# Patient Record
Sex: FEMALE | Race: WHITE | Employment: FULL TIME | URBAN - METROPOLITAN AREA
[De-identification: names, ages, dates, MRNs, and addresses within clinical notes are randomized per-mention and may not be internally consistent; named-entity substitution may affect disease eponyms.]

---

## 2021-05-08 ENCOUNTER — APPOINTMENT (OUTPATIENT)
Dept: GENERAL RADIOLOGY | Age: 71
DRG: 481 | End: 2021-05-08
Attending: ORTHOPAEDIC SURGERY
Payer: OTHER MISCELLANEOUS

## 2021-05-08 ENCOUNTER — APPOINTMENT (OUTPATIENT)
Dept: GENERAL RADIOLOGY | Age: 71
DRG: 481 | End: 2021-05-08
Attending: STUDENT IN AN ORGANIZED HEALTH CARE EDUCATION/TRAINING PROGRAM
Payer: OTHER MISCELLANEOUS

## 2021-05-08 ENCOUNTER — HOSPITAL ENCOUNTER (INPATIENT)
Age: 71
LOS: 4 days | Discharge: HOME HEALTH | DRG: 481 | End: 2021-05-12
Attending: STUDENT IN AN ORGANIZED HEALTH CARE EDUCATION/TRAINING PROGRAM | Admitting: HOSPITALIST
Payer: OTHER MISCELLANEOUS

## 2021-05-08 ENCOUNTER — ANESTHESIA EVENT (OUTPATIENT)
Dept: SURGERY | Age: 71
DRG: 481 | End: 2021-05-08
Payer: OTHER MISCELLANEOUS

## 2021-05-08 ENCOUNTER — ANESTHESIA (OUTPATIENT)
Dept: SURGERY | Age: 71
DRG: 481 | End: 2021-05-08
Payer: OTHER MISCELLANEOUS

## 2021-05-08 DIAGNOSIS — S72.002A CLOSED FRACTURE OF LEFT HIP, INITIAL ENCOUNTER (HCC): ICD-10-CM
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
## 2021-05-08 DIAGNOSIS — S22.42XA CLOSED FRACTURE OF MULTIPLE RIBS OF LEFT SIDE, INITIAL ENCOUNTER: Primary | ICD-10-CM

## 2021-05-08 PROBLEM — F17.200 SMOKING: Status: ACTIVE | Noted: 2021-05-08

## 2021-05-08 LAB
AMPHET UR QL SCN: NEGATIVE
ANION GAP SERPL CALC-SCNC: 9 MMOL/L (ref 3–18)
BARBITURATES UR QL SCN: NEGATIVE
BASOPHILS # BLD: 0 K/UL (ref 0–0.1)
BASOPHILS NFR BLD: 0 % (ref 0–2)
BENZODIAZ UR QL: POSITIVE
BUN SERPL-MCNC: 7 MG/DL (ref 7–18)
BUN/CREAT SERPL: 12 (ref 12–20)
CALCIUM SERPL-MCNC: 8.4 MG/DL (ref 8.5–10.1)
CANNABINOIDS UR QL SCN: NEGATIVE
CHLORIDE SERPL-SCNC: 108 MMOL/L (ref 100–111)
CO2 SERPL-SCNC: 24 MMOL/L (ref 21–32)
COCAINE UR QL SCN: NEGATIVE
COVID-19 RAPID TEST, COVR: NOT DETECTED
CREAT SERPL-MCNC: 0.57 MG/DL (ref 0.6–1.3)
DIFFERENTIAL METHOD BLD: ABNORMAL
EOSINOPHIL # BLD: 0.1 K/UL (ref 0–0.4)
EOSINOPHIL NFR BLD: 1 % (ref 0–5)
ERYTHROCYTE [DISTWIDTH] IN BLOOD BY AUTOMATED COUNT: 12.9 % (ref 11.6–14.5)
EST. AVERAGE GLUCOSE BLD GHB EST-MCNC: 128 MG/DL
GLUCOSE BLD STRIP.AUTO-MCNC: 127 MG/DL (ref 70–110)
GLUCOSE BLD STRIP.AUTO-MCNC: 238 MG/DL (ref 70–110)
GLUCOSE SERPL-MCNC: 154 MG/DL (ref 74–99)
HBA1C MFR BLD: 6.1 % (ref 4.2–5.6)
HCT VFR BLD AUTO: 38 % (ref 35–45)
HDSCOM,HDSCOM: ABNORMAL
HGB BLD-MCNC: 12.4 G/DL (ref 12–16)
LYMPHOCYTES # BLD: 1.4 K/UL (ref 0.9–3.6)
LYMPHOCYTES NFR BLD: 16 % (ref 21–52)
MCH RBC QN AUTO: 33.6 PG (ref 24–34)
MCHC RBC AUTO-ENTMCNC: 32.6 G/DL (ref 31–37)
MCV RBC AUTO: 103 FL (ref 74–97)
METHADONE UR QL: NEGATIVE
MONOCYTES # BLD: 0.7 K/UL (ref 0.05–1.2)
MONOCYTES NFR BLD: 8 % (ref 3–10)
NEUTS SEG # BLD: 6.4 K/UL (ref 1.8–8)
NEUTS SEG NFR BLD: 74 % (ref 40–73)
OPIATES UR QL: NEGATIVE
PCP UR QL: NEGATIVE
PLATELET # BLD AUTO: 212 K/UL (ref 135–420)
PMV BLD AUTO: 9.5 FL (ref 9.2–11.8)
POTASSIUM SERPL-SCNC: 4.1 MMOL/L (ref 3.5–5.5)
RBC # BLD AUTO: 3.69 M/UL (ref 4.2–5.3)
SODIUM SERPL-SCNC: 141 MMOL/L (ref 136–145)
SOURCE, COVRS: NORMAL
WBC # BLD AUTO: 8.7 K/UL (ref 4.6–13.2)

## 2021-05-08 PROCEDURE — C1713 ANCHOR/SCREW BN/BN,TIS/BN: HCPCS | Performed by: ORTHOPAEDIC SURGERY

## 2021-05-08 PROCEDURE — 76060000034 HC ANESTHESIA 1.5 TO 2 HR: Performed by: ORTHOPAEDIC SURGERY

## 2021-05-08 PROCEDURE — 2709999900 HC NON-CHARGEABLE SUPPLY: Performed by: ORTHOPAEDIC SURGERY

## 2021-05-08 PROCEDURE — 77030008462 HC STPLR SKN PROX J&J -A: Performed by: ORTHOPAEDIC SURGERY

## 2021-05-08 PROCEDURE — 96374 THER/PROPH/DIAG INJ IV PUSH: CPT

## 2021-05-08 PROCEDURE — U0003 INFECTIOUS AGENT DETECTION BY NUCLEIC ACID (DNA OR RNA); SEVERE ACUTE RESPIRATORY SYNDROME CORONAVIRUS 2 (SARS-COV-2) (CORONAVIRUS DISEASE [COVID-19]), AMPLIFIED PROBE TECHNIQUE, MAKING USE OF HIGH THROUGHPUT TECHNOLOGIES AS DESCRIBED BY CMS-2020-01-R: HCPCS

## 2021-05-08 PROCEDURE — 74011000250 HC RX REV CODE- 250: Performed by: ORTHOPAEDIC SURGERY

## 2021-05-08 PROCEDURE — 74011250637 HC RX REV CODE- 250/637: Performed by: ORTHOPAEDIC SURGERY

## 2021-05-08 PROCEDURE — 74011250636 HC RX REV CODE- 250/636: Performed by: NURSE ANESTHETIST, CERTIFIED REGISTERED

## 2021-05-08 PROCEDURE — 74011000250 HC RX REV CODE- 250: Performed by: NURSE ANESTHETIST, CERTIFIED REGISTERED

## 2021-05-08 PROCEDURE — 73502 X-RAY EXAM HIP UNI 2-3 VIEWS: CPT

## 2021-05-08 PROCEDURE — 74011250636 HC RX REV CODE- 250/636: Performed by: ANESTHESIOLOGY

## 2021-05-08 PROCEDURE — 76010000153 HC OR TIME 1.5 TO 2 HR: Performed by: ORTHOPAEDIC SURGERY

## 2021-05-08 PROCEDURE — 77030012890

## 2021-05-08 PROCEDURE — 74011000258 HC RX REV CODE- 258: Performed by: NURSE ANESTHETIST, CERTIFIED REGISTERED

## 2021-05-08 PROCEDURE — 74011250636 HC RX REV CODE- 250/636: Performed by: HOSPITALIST

## 2021-05-08 PROCEDURE — 76210000016 HC OR PH I REC 1 TO 1.5 HR: Performed by: ORTHOPAEDIC SURGERY

## 2021-05-08 PROCEDURE — C1769 GUIDE WIRE: HCPCS | Performed by: ORTHOPAEDIC SURGERY

## 2021-05-08 PROCEDURE — 77030018673: Performed by: ORTHOPAEDIC SURGERY

## 2021-05-08 PROCEDURE — 87635 SARS-COV-2 COVID-19 AMP PRB: CPT

## 2021-05-08 PROCEDURE — 80307 DRUG TEST PRSMV CHEM ANLYZR: CPT

## 2021-05-08 PROCEDURE — 77030031139 HC SUT VCRL2 J&J -A: Performed by: ORTHOPAEDIC SURGERY

## 2021-05-08 PROCEDURE — 99285 EMERGENCY DEPT VISIT HI MDM: CPT

## 2021-05-08 PROCEDURE — 85025 COMPLETE CBC W/AUTO DIFF WBC: CPT

## 2021-05-08 PROCEDURE — 77030007327 HC GD ROD SYNT -C: Performed by: ORTHOPAEDIC SURGERY

## 2021-05-08 PROCEDURE — 74011636637 HC RX REV CODE- 636/637: Performed by: HOSPITALIST

## 2021-05-08 PROCEDURE — 80048 BASIC METABOLIC PNL TOTAL CA: CPT

## 2021-05-08 PROCEDURE — 74011250636 HC RX REV CODE- 250/636: Performed by: ORTHOPAEDIC SURGERY

## 2021-05-08 PROCEDURE — 77030040361 HC SLV COMPR DVT MDII -B: Performed by: ORTHOPAEDIC SURGERY

## 2021-05-08 PROCEDURE — 65270000029 HC RM PRIVATE

## 2021-05-08 PROCEDURE — 82962 GLUCOSE BLOOD TEST: CPT

## 2021-05-08 PROCEDURE — 77030003862 HC BIT DRL SYNT -B: Performed by: ORTHOPAEDIC SURGERY

## 2021-05-08 PROCEDURE — 77030027138 HC INCENT SPIROMETER -A

## 2021-05-08 PROCEDURE — 83036 HEMOGLOBIN GLYCOSYLATED A1C: CPT

## 2021-05-08 PROCEDURE — 93005 ELECTROCARDIOGRAM TRACING: CPT

## 2021-05-08 PROCEDURE — 71045 X-RAY EXAM CHEST 1 VIEW: CPT

## 2021-05-08 PROCEDURE — 74011250636 HC RX REV CODE- 250/636: Performed by: STUDENT IN AN ORGANIZED HEALTH CARE EDUCATION/TRAINING PROGRAM

## 2021-05-08 PROCEDURE — 0QS704Z REPOSITION LEFT UPPER FEMUR WITH INTERNAL FIXATION DEVICE, OPEN APPROACH: ICD-10-PCS | Performed by: ORTHOPAEDIC SURGERY

## 2021-05-08 PROCEDURE — 77030013079 HC BLNKT BAIR HGGR 3M -A: Performed by: ANESTHESIOLOGY

## 2021-05-08 PROCEDURE — 96375 TX/PRO/DX INJ NEW DRUG ADDON: CPT

## 2021-05-08 DEVICE — SCREW BNE L38MM DIA4.5MM PROX CORT TIB S STL ST LOK FULL: Type: IMPLANTABLE DEVICE | Site: HIP | Status: FUNCTIONAL

## 2021-05-08 DEVICE — IMPLANTABLE DEVICE: Type: IMPLANTABLE DEVICE | Site: HIP | Status: FUNCTIONAL

## 2021-05-08 DEVICE — SCREW BNE L90MM DIA13MM STD CANC S STL LAG 1 STP RECESS FOR: Type: IMPLANTABLE DEVICE | Site: HIP | Status: FUNCTIONAL

## 2021-05-08 DEVICE — SCREW BNE L34MM DIA4.5MM PROX CORT TIB S STL ST LOK FULL: Type: IMPLANTABLE DEVICE | Site: HIP | Status: FUNCTIONAL

## 2021-05-08 DEVICE — SCREW BNE L36MM DIA4.5MM PROX CORT TIB S STL ST LOK FULL: Type: IMPLANTABLE DEVICE | Site: HIP | Status: FUNCTIONAL

## 2021-05-08 RX ORDER — LISINOPRIL 5 MG/1
10 TABLET ORAL DAILY
Status: DISCONTINUED | OUTPATIENT
Start: 2021-05-09 | End: 2021-05-12 | Stop reason: HOSPADM

## 2021-05-08 RX ORDER — SODIUM CHLORIDE 0.9 % (FLUSH) 0.9 %
5-40 SYRINGE (ML) INJECTION AS NEEDED
Status: DISCONTINUED | OUTPATIENT
Start: 2021-05-08 | End: 2021-05-09 | Stop reason: SDUPTHER

## 2021-05-08 RX ORDER — FACIAL-BODY WIPES
10 EACH TOPICAL DAILY PRN
Status: DISCONTINUED | OUTPATIENT
Start: 2021-05-08 | End: 2021-05-12 | Stop reason: HOSPADM

## 2021-05-08 RX ORDER — GLIPIZIDE 10 MG/1
10 TABLET ORAL 2 TIMES DAILY
Status: DISCONTINUED | OUTPATIENT
Start: 2021-05-08 | End: 2021-05-08

## 2021-05-08 RX ORDER — HYDROMORPHONE HYDROCHLORIDE 1 MG/ML
0.5 INJECTION, SOLUTION INTRAMUSCULAR; INTRAVENOUS; SUBCUTANEOUS
Status: DISCONTINUED | OUTPATIENT
Start: 2021-05-08 | End: 2021-05-08 | Stop reason: HOSPADM

## 2021-05-08 RX ORDER — NALOXONE HYDROCHLORIDE 0.4 MG/ML
0.1 INJECTION, SOLUTION INTRAMUSCULAR; INTRAVENOUS; SUBCUTANEOUS AS NEEDED
Status: DISCONTINUED | OUTPATIENT
Start: 2021-05-08 | End: 2021-05-08 | Stop reason: HOSPADM

## 2021-05-08 RX ORDER — DEXTROSE 50 % IN WATER (D50W) INTRAVENOUS SYRINGE
25-50 AS NEEDED
Status: DISCONTINUED | OUTPATIENT
Start: 2021-05-08 | End: 2021-05-08 | Stop reason: HOSPADM

## 2021-05-08 RX ORDER — INSULIN GLARGINE 100 [IU]/ML
5 INJECTION, SOLUTION SUBCUTANEOUS
Status: DISCONTINUED | OUTPATIENT
Start: 2021-05-08 | End: 2021-05-09

## 2021-05-08 RX ORDER — SODIUM CHLORIDE 0.9 % (FLUSH) 0.9 %
5-40 SYRINGE (ML) INJECTION AS NEEDED
Status: DISCONTINUED | OUTPATIENT
Start: 2021-05-08 | End: 2021-05-12 | Stop reason: HOSPADM

## 2021-05-08 RX ORDER — PROPOFOL 10 MG/ML
INJECTION, EMULSION INTRAVENOUS
Status: DISCONTINUED | OUTPATIENT
Start: 2021-05-08 | End: 2021-05-08 | Stop reason: HOSPADM

## 2021-05-08 RX ORDER — EPHEDRINE SULFATE/0.9% NACL/PF 50 MG/5 ML
SYRINGE (ML) INTRAVENOUS AS NEEDED
Status: DISCONTINUED | OUTPATIENT
Start: 2021-05-08 | End: 2021-05-08 | Stop reason: HOSPADM

## 2021-05-08 RX ORDER — PROMETHAZINE HYDROCHLORIDE 25 MG/1
12.5 TABLET ORAL
Status: DISCONTINUED | OUTPATIENT
Start: 2021-05-08 | End: 2021-05-12 | Stop reason: HOSPADM

## 2021-05-08 RX ORDER — SODIUM CHLORIDE 0.9 % (FLUSH) 0.9 %
5-40 SYRINGE (ML) INJECTION EVERY 8 HOURS
Status: DISCONTINUED | OUTPATIENT
Start: 2021-05-08 | End: 2021-05-08 | Stop reason: HOSPADM

## 2021-05-08 RX ORDER — HYDROMORPHONE HYDROCHLORIDE 1 MG/ML
1 INJECTION, SOLUTION INTRAMUSCULAR; INTRAVENOUS; SUBCUTANEOUS
Status: DISCONTINUED | OUTPATIENT
Start: 2021-05-08 | End: 2021-05-10

## 2021-05-08 RX ORDER — FENTANYL CITRATE 50 UG/ML
50 INJECTION, SOLUTION INTRAMUSCULAR; INTRAVENOUS AS NEEDED
Status: DISCONTINUED | OUTPATIENT
Start: 2021-05-08 | End: 2021-05-08 | Stop reason: HOSPADM

## 2021-05-08 RX ORDER — ONDANSETRON 2 MG/ML
INJECTION INTRAMUSCULAR; INTRAVENOUS AS NEEDED
Status: DISCONTINUED | OUTPATIENT
Start: 2021-05-08 | End: 2021-05-08 | Stop reason: HOSPADM

## 2021-05-08 RX ORDER — ENOXAPARIN SODIUM 100 MG/ML
40 INJECTION SUBCUTANEOUS DAILY
Status: DISCONTINUED | OUTPATIENT
Start: 2021-05-09 | End: 2021-05-08 | Stop reason: SDUPTHER

## 2021-05-08 RX ORDER — MAGNESIUM SULFATE 100 %
4 CRYSTALS MISCELLANEOUS AS NEEDED
Status: DISCONTINUED | OUTPATIENT
Start: 2021-05-08 | End: 2021-05-08 | Stop reason: HOSPADM

## 2021-05-08 RX ORDER — SODIUM CHLORIDE 0.9 % (FLUSH) 0.9 %
5-40 SYRINGE (ML) INJECTION EVERY 8 HOURS
Status: DISCONTINUED | OUTPATIENT
Start: 2021-05-08 | End: 2021-05-09 | Stop reason: SDUPTHER

## 2021-05-08 RX ORDER — LABETALOL HCL 20 MG/4 ML
10 SYRINGE (ML) INTRAVENOUS
Status: COMPLETED | OUTPATIENT
Start: 2021-05-08 | End: 2021-05-08

## 2021-05-08 RX ORDER — ACETAMINOPHEN 650 MG/1
650 SUPPOSITORY RECTAL
Status: DISCONTINUED | OUTPATIENT
Start: 2021-05-08 | End: 2021-05-12 | Stop reason: HOSPADM

## 2021-05-08 RX ORDER — FLUMAZENIL 0.1 MG/ML
0.2 INJECTION INTRAVENOUS
Status: DISCONTINUED | OUTPATIENT
Start: 2021-05-08 | End: 2021-05-08 | Stop reason: HOSPADM

## 2021-05-08 RX ORDER — ACETAMINOPHEN 325 MG/1
650 TABLET ORAL
Status: DISCONTINUED | OUTPATIENT
Start: 2021-05-08 | End: 2021-05-12 | Stop reason: HOSPADM

## 2021-05-08 RX ORDER — LIDOCAINE HYDROCHLORIDE 20 MG/ML
INJECTION, SOLUTION EPIDURAL; INFILTRATION; INTRACAUDAL; PERINEURAL AS NEEDED
Status: DISCONTINUED | OUTPATIENT
Start: 2021-05-08 | End: 2021-05-08 | Stop reason: HOSPADM

## 2021-05-08 RX ORDER — MIDAZOLAM HYDROCHLORIDE 1 MG/ML
INJECTION, SOLUTION INTRAMUSCULAR; INTRAVENOUS AS NEEDED
Status: DISCONTINUED | OUTPATIENT
Start: 2021-05-08 | End: 2021-05-08 | Stop reason: HOSPADM

## 2021-05-08 RX ORDER — FENTANYL CITRATE 50 UG/ML
25 INJECTION, SOLUTION INTRAMUSCULAR; INTRAVENOUS
Status: COMPLETED | OUTPATIENT
Start: 2021-05-08 | End: 2021-05-08

## 2021-05-08 RX ORDER — MAGNESIUM SULFATE 100 %
4 CRYSTALS MISCELLANEOUS AS NEEDED
Status: DISCONTINUED | OUTPATIENT
Start: 2021-05-08 | End: 2021-05-12 | Stop reason: HOSPADM

## 2021-05-08 RX ORDER — METFORMIN HYDROCHLORIDE 1000 MG/1
1000 TABLET ORAL 2 TIMES DAILY WITH MEALS
COMMUNITY

## 2021-05-08 RX ORDER — HYDROMORPHONE HYDROCHLORIDE 2 MG/1
2 TABLET ORAL
Status: DISCONTINUED | OUTPATIENT
Start: 2021-05-08 | End: 2021-05-12 | Stop reason: HOSPADM

## 2021-05-08 RX ORDER — SODIUM CHLORIDE 0.9 % (FLUSH) 0.9 %
5-40 SYRINGE (ML) INJECTION AS NEEDED
Status: DISCONTINUED | OUTPATIENT
Start: 2021-05-08 | End: 2021-05-08 | Stop reason: HOSPADM

## 2021-05-08 RX ORDER — PROPOFOL 10 MG/ML
INJECTION, EMULSION INTRAVENOUS AS NEEDED
Status: DISCONTINUED | OUTPATIENT
Start: 2021-05-08 | End: 2021-05-08 | Stop reason: HOSPADM

## 2021-05-08 RX ORDER — IBUPROFEN 200 MG
1 TABLET ORAL DAILY
Status: DISCONTINUED | OUTPATIENT
Start: 2021-05-09 | End: 2021-05-12 | Stop reason: HOSPADM

## 2021-05-08 RX ORDER — CEFAZOLIN SODIUM/WATER 2 G/20 ML
2 SYRINGE (ML) INTRAVENOUS ONCE
Status: COMPLETED | OUTPATIENT
Start: 2021-05-08 | End: 2021-05-08

## 2021-05-08 RX ORDER — SENNOSIDES 8.6 MG/1
1 TABLET ORAL 2 TIMES DAILY
Status: DISCONTINUED | OUTPATIENT
Start: 2021-05-08 | End: 2021-05-12 | Stop reason: HOSPADM

## 2021-05-08 RX ORDER — LISINOPRIL 10 MG/1
TABLET ORAL DAILY
COMMUNITY

## 2021-05-08 RX ORDER — NALOXONE HYDROCHLORIDE 0.4 MG/ML
0.4 INJECTION, SOLUTION INTRAMUSCULAR; INTRAVENOUS; SUBCUTANEOUS AS NEEDED
Status: DISCONTINUED | OUTPATIENT
Start: 2021-05-08 | End: 2021-05-12 | Stop reason: HOSPADM

## 2021-05-08 RX ORDER — FENTANYL CITRATE 50 UG/ML
INJECTION, SOLUTION INTRAMUSCULAR; INTRAVENOUS AS NEEDED
Status: DISCONTINUED | OUTPATIENT
Start: 2021-05-08 | End: 2021-05-08 | Stop reason: HOSPADM

## 2021-05-08 RX ORDER — CEFAZOLIN SODIUM/WATER 2 G/20 ML
2 SYRINGE (ML) INTRAVENOUS EVERY 8 HOURS
Status: COMPLETED | OUTPATIENT
Start: 2021-05-08 | End: 2021-05-09

## 2021-05-08 RX ORDER — ENOXAPARIN SODIUM 100 MG/ML
40 INJECTION SUBCUTANEOUS DAILY
Status: DISCONTINUED | OUTPATIENT
Start: 2021-05-09 | End: 2021-05-12 | Stop reason: HOSPADM

## 2021-05-08 RX ORDER — ONDANSETRON 2 MG/ML
4 INJECTION INTRAMUSCULAR; INTRAVENOUS
Status: DISCONTINUED | OUTPATIENT
Start: 2021-05-08 | End: 2021-05-12 | Stop reason: HOSPADM

## 2021-05-08 RX ORDER — INSULIN LISPRO 100 [IU]/ML
INJECTION, SOLUTION INTRAVENOUS; SUBCUTANEOUS EVERY 6 HOURS
Status: DISCONTINUED | OUTPATIENT
Start: 2021-05-08 | End: 2021-05-12 | Stop reason: HOSPADM

## 2021-05-08 RX ORDER — KETAMINE HCL IN 0.9 % NACL 50 MG/5 ML
SYRINGE (ML) INTRAVENOUS AS NEEDED
Status: DISCONTINUED | OUTPATIENT
Start: 2021-05-08 | End: 2021-05-08 | Stop reason: HOSPADM

## 2021-05-08 RX ORDER — OXYCODONE AND ACETAMINOPHEN 5; 325 MG/1; MG/1
2 TABLET ORAL
Status: DISCONTINUED | OUTPATIENT
Start: 2021-05-08 | End: 2021-05-12 | Stop reason: HOSPADM

## 2021-05-08 RX ORDER — SODIUM CHLORIDE, SODIUM LACTATE, POTASSIUM CHLORIDE, CALCIUM CHLORIDE 600; 310; 30; 20 MG/100ML; MG/100ML; MG/100ML; MG/100ML
INJECTION, SOLUTION INTRAVENOUS
Status: DISCONTINUED | OUTPATIENT
Start: 2021-05-08 | End: 2021-05-08 | Stop reason: HOSPADM

## 2021-05-08 RX ORDER — PROCHLORPERAZINE EDISYLATE 5 MG/ML
5 INJECTION INTRAMUSCULAR; INTRAVENOUS ONCE
Status: DISCONTINUED | OUTPATIENT
Start: 2021-05-08 | End: 2021-05-08 | Stop reason: HOSPADM

## 2021-05-08 RX ORDER — HYDRALAZINE HYDROCHLORIDE 20 MG/ML
10 INJECTION INTRAMUSCULAR; INTRAVENOUS
Status: DISCONTINUED | OUTPATIENT
Start: 2021-05-08 | End: 2021-05-12 | Stop reason: HOSPADM

## 2021-05-08 RX ORDER — METFORMIN HYDROCHLORIDE 500 MG/1
1000 TABLET ORAL 2 TIMES DAILY WITH MEALS
Status: DISCONTINUED | OUTPATIENT
Start: 2021-05-08 | End: 2021-05-08

## 2021-05-08 RX ORDER — SODIUM CHLORIDE 0.9 % (FLUSH) 0.9 %
5-40 SYRINGE (ML) INJECTION EVERY 8 HOURS
Status: DISCONTINUED | OUTPATIENT
Start: 2021-05-08 | End: 2021-05-12 | Stop reason: HOSPADM

## 2021-05-08 RX ORDER — DEXTROSE 50 % IN WATER (D50W) INTRAVENOUS SYRINGE
25-50 AS NEEDED
Status: DISCONTINUED | OUTPATIENT
Start: 2021-05-08 | End: 2021-05-12 | Stop reason: HOSPADM

## 2021-05-08 RX ORDER — ONDANSETRON 2 MG/ML
4 INJECTION INTRAMUSCULAR; INTRAVENOUS
Status: COMPLETED | OUTPATIENT
Start: 2021-05-08 | End: 2021-05-08

## 2021-05-08 RX ORDER — GLIPIZIDE 10 MG/1
10 TABLET ORAL 2 TIMES DAILY
COMMUNITY

## 2021-05-08 RX ADMIN — CEFAZOLIN 2 G: 10 INJECTION, POWDER, FOR SOLUTION INTRAVENOUS at 22:30

## 2021-05-08 RX ADMIN — HYDRALAZINE HYDROCHLORIDE 10 MG: 20 INJECTION INTRAMUSCULAR; INTRAVENOUS at 18:47

## 2021-05-08 RX ADMIN — MEPIVACAINE HYDROCHLORIDE 40 MG: 15 INJECTION, SOLUTION EPIDURAL; INFILTRATION at 14:25

## 2021-05-08 RX ADMIN — PHENYLEPHRINE HYDROCHLORIDE 100 MCG: 10 INJECTION INTRAVENOUS at 15:18

## 2021-05-08 RX ADMIN — FENTANYL CITRATE 50 MCG: 50 INJECTION, SOLUTION INTRAMUSCULAR; INTRAVENOUS at 14:17

## 2021-05-08 RX ADMIN — ONDANSETRON HYDROCHLORIDE 4 MG: 2 INJECTION INTRAMUSCULAR; INTRAVENOUS at 14:51

## 2021-05-08 RX ADMIN — PROPOFOL 20 MG: 10 INJECTION, EMULSION INTRAVENOUS at 14:23

## 2021-05-08 RX ADMIN — SODIUM CHLORIDE, SODIUM LACTATE, POTASSIUM CHLORIDE, AND CALCIUM CHLORIDE: 600; 310; 30; 20 INJECTION, SOLUTION INTRAVENOUS at 14:57

## 2021-05-08 RX ADMIN — LIDOCAINE HYDROCHLORIDE 60 MG: 20 INJECTION, SOLUTION EPIDURAL; INFILTRATION; INTRACAUDAL; PERINEURAL at 14:21

## 2021-05-08 RX ADMIN — Medication 10 MG: at 15:29

## 2021-05-08 RX ADMIN — LABETALOL HYDROCHLORIDE 10 MG: 5 INJECTION, SOLUTION INTRAVENOUS at 12:54

## 2021-05-08 RX ADMIN — SODIUM CHLORIDE, SODIUM LACTATE, POTASSIUM CHLORIDE, AND CALCIUM CHLORIDE: 600; 310; 30; 20 INJECTION, SOLUTION INTRAVENOUS at 14:20

## 2021-05-08 RX ADMIN — Medication 10 MG: at 14:13

## 2021-05-08 RX ADMIN — PROPOFOL 50 MCG/KG/MIN: 10 INJECTION, EMULSION INTRAVENOUS at 14:28

## 2021-05-08 RX ADMIN — ONDANSETRON 4 MG: 2 INJECTION INTRAMUSCULAR; INTRAVENOUS at 11:38

## 2021-05-08 RX ADMIN — CEFAZOLIN 2 G: 1 INJECTION, POWDER, FOR SOLUTION INTRAVENOUS at 14:40

## 2021-05-08 RX ADMIN — PROPOFOL 50 MG: 10 INJECTION, EMULSION INTRAVENOUS at 14:21

## 2021-05-08 RX ADMIN — SENNOSIDES 8.6 MG: 8.6 TABLET, FILM COATED ORAL at 20:43

## 2021-05-08 RX ADMIN — HYDROMORPHONE HYDROCHLORIDE 1 MG: 1 INJECTION, SOLUTION INTRAMUSCULAR; INTRAVENOUS; SUBCUTANEOUS at 20:10

## 2021-05-08 RX ADMIN — HYDROMORPHONE HYDROCHLORIDE 2 MG: 2 TABLET ORAL at 18:06

## 2021-05-08 RX ADMIN — PHENYLEPHRINE HYDROCHLORIDE 100 MCG: 10 INJECTION INTRAVENOUS at 14:32

## 2021-05-08 RX ADMIN — PHENYLEPHRINE HYDROCHLORIDE 100 MCG: 10 INJECTION INTRAVENOUS at 14:45

## 2021-05-08 RX ADMIN — OXYCODONE HYDROCHLORIDE AND ACETAMINOPHEN 2 TABLET: 5; 325 TABLET ORAL at 22:37

## 2021-05-08 RX ADMIN — MIDAZOLAM 1 MG: 1 INJECTION INTRAMUSCULAR; INTRAVENOUS at 14:13

## 2021-05-08 RX ADMIN — FENTANYL CITRATE 25 MCG: 50 INJECTION, SOLUTION INTRAMUSCULAR; INTRAVENOUS at 11:38

## 2021-05-08 RX ADMIN — INSULIN GLARGINE 5 UNITS: 100 INJECTION, SOLUTION SUBCUTANEOUS at 22:30

## 2021-05-08 RX ADMIN — FENTANYL CITRATE 50 MCG: 50 INJECTION, SOLUTION INTRAMUSCULAR; INTRAVENOUS at 14:13

## 2021-05-08 NOTE — H&P
History and Physical        Patient: Louis Packer               Sex: female          DOA: 5/8/2021         YOB: 1950      Age:  79 y.o.        LOS:  LOS: 0 days        HPI:     Louis Packer is a 79 y.o. female who was tripped at work today and fell on the left hip. Had pain and inability to walk. Brought to ER for evaluation. No LOC. Past Medical History:   Diagnosis Date    Diabetes ([facility] [address]     Hypertension        History reviewed. No pertinent surgical history. History reviewed. No pertinent family history. Social History     Socioeconomic History    Marital status:      Spouse name: Not on file    Number of children: Not on file    Years of education: Not on file    Highest education level: Not on file       Prior to Admission medications    Medication Sig Start Date End Date Taking? Authorizing Provider   lisinopriL (PRINIVIL, ZESTRIL) 10 mg tablet Take  by mouth daily. Yes Other, MD Seema   glipiZIDE (GLUCOTROL) 10 mg tablet Take 10 mg by mouth two (2) times a day. Yes Abner, MD Seema   metFORMIN (GLUCOPHAGE) 1,000 mg tablet Take 1,000 mg by mouth two (2) times daily (with meals). Yes Abner, MD Seema       No Known Allergies    Review of Systems  A comprehensive review of systems was negative. Physical Exam:      Visit Vitals  BP (!) 174/70   Pulse 96   Temp 97.8 °F (36.6 °C)   Resp 20   Wt 56.7 kg (125 lb)   SpO2 96%       Physical Exam:  Physical Exam:   General:  Alert, cooperative, no distress, appears stated age. Eyes:  Conjunctivae/corneas clear. PERRL, EOMs intact. Fundi benign   Ears:  Normal TMs and external ear canals both ears. Nose: Nares normal. Septum midline. Mucosa normal. No drainage or sinus tenderness. Mouth/Throat: Lips, mucosa, and tongue normal. Teeth and gums normal.   Neck: Supple, symmetrical, trachea midline, no adenopathy, thyroid: no enlargement/tenderness/nodules, no carotid bruit and no JVD.    Back:   Symmetric, no curvature. ROM normal. No CVA tenderness. Lungs:   Clear to auscultation bilaterally. Heart:  Regular rate and rhythm, S1, S2 normal, no murmur, click, rub or gallop. Abdomen:   Soft, non-tender. Bowel sounds normal. No masses,  No organomegaly. Extremities: Extremities normal, atraumatic, no cyanosis or edema. Pulses: 2+ and symmetric all extremities. Skin: Skin color, texture, turgor normal. No rashes or lesions   Lymph nodes: Cervical, supraclavicular, and axillary nodes normal.   Neurologic: CNII-XII intact. Normal strength, sensation and reflexes throughout. Labs Reviewed:    CBC:   Lab Results   Component Value Date/Time    WBC 8.7 05/08/2021 11:30 AM    HGB 12.4 05/08/2021 11:30 AM    HCT 38.0 05/08/2021 11:30 AM     05/08/2021 11:30 AM     Left hip:  Intertrochanteric fracture of left hip. Assessment/Plan     Principal Problem:    Closed left hip fracture ([address] (5/8/2021)    Active Problems:    Type 2 diabetes mellitus, without long-term current use of insulin (HCC) ()      Hypertension ()      Closed fracture of rib ()        79 y.o. female s/p fall with left intertrochanteric hip fracture. Plan for ORIF left hip. Risks/benefits discussed and all questions  Answered.

## 2021-05-08 NOTE — PERIOP NOTES
TRANSFER - IN REPORT:    Verbal report received from ORN & CRNA on Dorothyann Second  being received from   OR (unit) for routine post - op      Report consisted of patients Situation, Background, Assessment and   Recommendations(SBAR). Information from the following report(s) SBAR was reviewed with the receiving nurse. Opportunity for questions and clarification was provided. Assessment completed upon patients arrival to unit and care assumed.

## 2021-05-08 NOTE — OP NOTES
Patient: Griffin Prasad MRN: [id number]  SSN: [id number]    YOB: 1950  Age: 79 y.o. Sex: female        Date of Procedure: 5/8/2021   Preoperative Diagnosis: fractured left hip  Postoperative Diagnosis: fractured left hip    Procedure: Procedure(s):  OPEN REDUCTION INTERNAL FIXATION LEFT HIP WITH C-ARM  Surgeon(s) and Role:     Shmuel Garcia MD - Primary  Assistant: Mai Ardon PA-C  OR Assitance: Surg Asst-1: Marcus Fly  Anesthesia: Spinal   Estimated Blood Loss: 50cc  Fluids: 1000cc  Specimens: * No specimens in log *   Findings: intertrochanteric hip fracture  Complications: none  Implants:   Implant Name Type Inv. Item Serial No.  Lot No. LRB No. Used Action   SCREW BNE L34MM DIA4. 5MM PROX EUGENIA TIB S STL ST DEBORAH FULL - [id number]  SCREW BNE L34MM DIA4. 5MM PROX EUGENIA TIB S STL ST [employer] FULL  [employer]_WD 0 Left 1 Implanted   SCREW BNE L90MM [id number] STD CANC S STL LAG 1 STP RECESS FOR - [id number]  SCREW BNE L90MM [id number] STD CANC S STL LAG 1 STP RECESS FOR  DEPU[employer]_WD 0 Left 1 Implanted   SCREW BNE L90MM [id number] THRD L22MM S STL LAG 1 STP FOR DHS - [id number]  SCREW BNE L90MM [id number] THRD L22MM S STL LAG 1 STP FOR DHS  DEPU[employer] [place]_WD 0 Left 1 Implanted   SCREW BNE L36MM DIA4. 5MM PROX EUGENIA TIB S STL ST [employer] FULL - [id number]  SCREW BNE L36MM DIA4. 5MM PROX EUGENIA TIB S STL ST [employer] FULL  DEPU[employer]_WD 0 Left 2 Implanted   SCREW BNE L38MM DIA4. 5MM PROX EUGENIA TIB S STL ST [employer] FULL - [id number]  SCREW BNE L38MM DIA4. 5MM PROX EUGENIA TIB S STL ST DEB[employer]AH FULL  [employer]PU[employer] [place]_WD 0 Left 1 Implanted   PLATE BNE C31ZA BLDE [id number] 135DEG 4 H BILAT HIP PELV S - [id number]  PLATE BNE K57VH BLDE [id number] 135DEG 4 H BILAT HIP PELV S  DEPU[employer] [place]_WD 0 Left 1 Implanted           INDICATIONS: The patient is a 79 y.o. female who fell sustaining a left intertrochanteric hip fracture. Comes now for operative intervention.      DESCRIPTION OF PROCEDURE: After correct identification, the patient was   taken to the operating room, placed supine on the [place] table. General   endotracheal tube anesthesia induced. Two grams of Ancef were given. Left hip   fracture was then reduced under intraoperative fluoroscopy. The Left hip was then prepped and draped in the usual sterile fashion. Using the   Incision was made over the lateral hip and taken down through the fascia tex tendon and posterior to the quadriceps. This allowed excellent visualization of the lateral femur. Using the [employer] DHS kit, the K-wire was then placed into the central position of the femoral head from the lateral femur as seen on intraoperative X-ray in the AP and lateral views. This was then overdrilled with the trochanteric drill   and the 35 degree 4-hole plate with the lag screw were place over the K-wire under intraoperative fluoroscopy. 4.5mm screws were then placed through the plate, starting with the 3.2mm drill followed by self-tapping screws. Intraoperative x-ray revealed excellent alingment of the hardware and anatomy. The wound was then irrigated. Fascia closed with 0 Vicryl suture, the subcutaneous tissue approximated with 2-0 Vicryl suture and the skin   approximated with staples. Sterile dressing was applied. The patient   tolerated the procedure well, taken to the recovery room in good   Condition. Assistant surgeon was needed throughout the procedure for managing bleeding, improving visualization and closure of the surgical wounds.

## 2021-05-08 NOTE — PROGRESS NOTES
1710  Received client from PACU in satisfactory condition. Client is a pt of  and Dr Jarod Armstrong. Pt had a ORIF of Left Hip Total Hip Replacement today. Client is A/O X 4. Client is calm and cooperative. Clients PERRLA. Denies numbness or tingling to any extremity. With + Posterior Tibial and Dorsalis Pedis pulses. Capillary Refill less than 3 seconds. Skin is warm , dry and skin color is appropriate to race. Client is negative for JVD. Bibasilar breath sounds clear. No use of accessory muscles. Bowel sounds hypoacrive to all quadrants. Abdomen is soft and non-tender. Client has odonnell cath drainig clear yellow urine. post-operatively. No bladder distention evident. No complaints of bladder discomfort. Client has a Kerlix and medipore dressing to left hip. Dressing is dry and intact. No other skin integrity issues present. Rafiq hose applied to both legs. Sequential compression device applied. Client has RW 18 gauge PIV present and running LR to [facility]. Delsa Severance Clients pain is 8/10 on 0-10 scale. Client oriented to call bell use as well as bed use. Client oriented to phone and how to order meals. Call bell within reach. Bed in low position. Three side rails up. Armbands/ fall risk band intact. Dual skin check done with Brian Shane RN. Pt has abrasion on left elbow with large   Bandaid on.  1849   Pt's Bp was 193/93. Dr Jassi Saab aware. Given Hydralazine 10 mg IV.   1910  /66.

## 2021-05-08 NOTE — ROUTINE PROCESS
[place] TRANSFER - IN REPORT: 
 
Verbal report received from [address] [facility] RN(name) on [facility]  being received from [place]) for routine post - op Report consisted of patients Situation, Background, Assessment and  
Recommendations(SBAR). Information from the following report(s) SBAR, Kardex and MAR was reviewed with the receiving nurse. Opportunity for questions and clarification was provided. Assessment completed upon patients arrival to unit and care assumed.

## 2021-05-08 NOTE — ED PROVIDER NOTES
EMERGENCY DEPARTMENT HISTORY AND PHYSICAL EXAM      Date: 5/8/2021  Patient Name: Charis Su    History of Presenting Illness     Chief Complaint   Patient presents with    Fall       History Provided By: Patient    HPI:  Charis Su is a 79 y.o. female with history of type 2 diabetes who complains of left hip pain, from fall just prior to arrival, she was at work, when she fell from a standing position, she tripped over a another person's foot, is unable to ambulate, unable to lift her left leg, sensation intact, no significant pain at this time. PMH, PSH, family history, social history, allergies reviewed with the patient with significant items noted above.       PCP: Carmencita SALAS NP    Current Facility-Administered Medications   Medication Dose Route Frequency Provider Last Rate Last Admin    magnesium sulfate 2 g/50 ml IVPB (premix or compounded)  2 g IntraVENous Rosita Gaytan MD        magnesium sulfate 1 g/100 ml IVPB (premix or compounded)  1 g IntraVENous Rosita Gaytan MD        insulin glargine (LANTUS) injection 10 Units  10 Units SubCUTAneous QHS Deandra Infante MD   10 Units at 05/10/21 0007    insulin lispro (HUMALOG) injection   SubCUTAneous Q6H Deandra Infante MD   Stopped at 05/09/21 1800    glucose chewable tablet 16 g  4 Tab Oral PRN Deandra Infante MD        glucagon [facility]) injection 1 mg  1 mg IntraMUSCular PRN Deandra Infante MD        dextrose (D50W) injection syrg 12.5-25 g  25-50 mL IntraVENous PRN Deandra Infante MD        sodium chloride (NS) flush 5-40 mL  5-40 mL IntraVENous Q8H Adrianna Jeffries MD   Stopped at 05/10/21 1400    sodium chloride (NS) flush 5-40 mL  5-40 mL IntraVENous PRN Adrianna Jeffries MD        acetaminophen (TYLENOL) tablet 650 mg  650 mg Oral Q4H PRN Adrianna Jeffries MD        oxyCODONE-acetaminophen (PERCOCET) 5-325 mg per tablet 2 Tab  2 Tab Oral Q4H PRN Adrianna Jeffries MD   2 Tab at 05/10/21 1408    HYDROmorphone (DILAUDID) tablet 2 mg  2 mg Oral Q4H PRN Rufino Wang MD   2 mg at 05/08/21 1806    naloxone [facility]) injection 0.4 mg  0.4 mg IntraVENous PRN Rufino Wang MD        ondansetron [facility]) injection 4 mg  4 mg IntraVENous Q4H PRN Rufino Wang MD        senna (SENOKOT) tablet 8.6 mg  1 Tab Oral BID Rufino Wang MD   8.6 mg at 05/10/21 0857    enoxaparin (LOVENOX) injection 40 mg  40 mg SubCUTAneous DAILY Rufino Wang MD        lisinopriL (PRINIVIL, ZESTRIL) tablet 10 mg  10 mg Oral DAILY Rufino Wang MD   10 mg at 05/10/21 5683    hydrALAZINE (APRESOLINE) 20 mg/mL injection 10 mg  10 mg IntraVENous Q6H PRN Adrián Lopes MD   10 mg at 05/09/21 0028    nicotine (NICODERM CQ) 14 mg/24 hr patch 1 Patch  1 Patch TransDERmal DAILY Adrián Lopes MD        acetaminophen (TYLENOL) tablet 650 mg  650 mg Oral Q6H PRN Adrián Lopes MD        Or    acetaminophen (TYLENOL) suppository 650 mg  650 mg Rectal Q6H PRN Adrián Lopes MD        bisacodyL (DULCOLAX) suppository 10 mg  10 mg Rectal DAILY PRN Adrián Lopes MD        promethazine (PHENERGAN) tablet 12.5 mg  12.5 mg Oral Q6H PRN Adrián Lopes MD        Or    ondansetron [facility]) injection 4 mg  4 mg IntraVENous Q6H PRN Adrián Lopes MD           Past History     Past Medical History:  Past Medical History:   Diagnosis Date    Diabetes ([address]     Hypertension        Past Surgical History:  History reviewed. No pertinent surgical history. Family History:  History reviewed. No pertinent family history.     Social History:  Social History     Tobacco Use    Smoking status: Not on file   Substance Use Topics    Alcohol use: Not on file    Drug use: Not on file       Allergies:  No Known Allergies    Review of Systems   Review of Systems    In addition to that documented in the HPI above  All other review of systems negative    Constitutional: Denies fevers or chills  Eyes: Denies vision changes  ENMT: Denies sore throat  CV: Denies chest pain  Resp: Denies SOB  GI: Denies vomiting or diarrhea  : Denies painful urination  MSK: Denies recent trauma  Skin: Denies new rashes  Neuro: Denies new numbness or tingling or weakness  Endocrine: Denies polyuria  Heme: Denies bleeding disorders    Physical Exam     Vitals:    05/10/21 0451 05/10/21 0504 05/10/21 0843 05/10/21 1209   BP: (!) 142/62   (!) 144/52   Pulse: (!) 111  (!) 115 (!) 108   Resp: 16  18 18   Temp: 98 °F (36.7 °C)  98 °F (36.7 °C) 98.4 °F (36.9 °C)   SpO2: 100%  99% 96%   Weight:  57.4 kg (126 lb 9.6 oz)       Physical Exam    Nursing notes and vital signs reviewed    General: Patient is awake and alert, resting comfortably in no acute distress  Head: Normocephalic and atraumatic  Eyes: Extraocular muscles intact, no conjunctival pallor  Ear, nose, throat: Normal external exam  Neck: Normal range of motion  Cardiovascular: RRR, No murmur auscultated, warm, well-perfused extremities  Respiratory: Patient is in no respiratory distress, lungs CTAB  GI: soft, non-tender, non-distended  MSK: mild deformity at proximal left femur, left leg is shortened and externally rotated. Extremities: pulses intact with good cap refills, no LE pitting edema or calf tenderness  Skin: Warm, dry, and intact  Neuro: The patient is alert and oriented, no gross motor or sensory defects noted. Psych: Appropriate mood and affect. Medical Decision Making   I am the first provider for this patient. I reviewed the vital signs, available nursing notes, past medical history, past surgical history, family history and social history. Vital Signs-Reviewed the patient's vital signs. Visit Vitals  BP (!) 144/52   Pulse (!) 108   Temp 98.4 °F (36.9 °C)   Resp 18   Wt 57.4 kg (126 lb 9.6 oz)   SpO2 96%       Pulse Oximetry Analysis - 96% on room air     Cardiac Monitor:  Rate: 108 bpm  Rhythm: ST    EKG interpretation: (Preliminary)  Interpreted by the EP.  EKG non diagnostic, without acute ischemic changes, arrhythmia, prolonged QT, or evidence of Ibis      Provider Notes (Medical Decision Making):   Sofia Kline is a 79 y.o. female with acute left hip fracture. Sensation and pulses intact, no indication for other imaging. Procedures:  Procedures    ED Course:   ED Course as of May 10 1435   Sat May 08, 2021   1204 No electrolyte abnormality on chemistry panel. CBC without leukocytosis or anemia. [DM]      ED Course User Index  [DM] Arsen Li MD        Intertrochanteric fracture of left hip. Left posterior rib fracture appears acute, without significant displacement. Possible eighth rib fracture as well. No pneumothorax. Planned admission per ortho for ORIF buy Dr. Hernan Schaffer    Will plan to admit for further management. The patient understands and agrees with the plan. Spoke with Dr. Emperatriz Archuleta the on call admitting clinician who agrees to admit patient. Appreciate the assistance in the care of this patient. Vitals Review/addressed -     Diagnostic Study Results     Orders Placed This Encounter    COVID-19 RAPID TEST     Standing Status:   Standing     Number of Occurrences:   1     Order Specific Question:   Is this test for diagnosis or screening? Answer:   Screening     Order Specific Question:   Symptomatic for COVID-19 as defined by CDC? Answer:   No     Order Specific Question:   Hospitalized for COVID-19? Answer:   No     Order Specific Question:   Admitted to ICU for COVID-19? Answer:   No     Order Specific Question:   Employed in healthcare setting? Answer:   No     Order Specific Question:   Resident in a congregate (group) care setting? Answer:   No     Order Specific Question:   Pregnant? Answer:   No     Order Specific Question:   Previously tested for COVID-19?      Answer:   Unknown    XR HIP LT W OR WO PELV 2-3 VWS     Standing Status:   Standing     Number of Occurrences:   1     Order Specific Question:   Transport     Answer:   BED [2]     Order Specific Question: Reason for Exam     Answer:   fall    XR CHEST PORT     Standing Status:   Standing     Number of Occurrences:   1     Order Specific Question:   Reason for Exam     Answer:   fracture    NC XR TECHNOLOGIST SERVICE     Standing Status:   Standing     Number of Occurrences:   1     Order Specific Question:   Transport     Answer:   No Transport [3]     Order Specific Question:   Reason for Exam     Answer:   fluoro    CBC WITH AUTOMATED DIFF     Standing Status:   Standing     Number of Occurrences:   1    BASIC METABOLIC PANEL     Standing Status:   Standing     Number of Occurrences:   1    NOVEL CORONAVIRUS (COVID-19)     Standing Status:   Standing     Number of Occurrences:   1    HEMOGLOBIN A1C     Standing Status:   Standing     Number of Occurrences:   1    METABOLIC PANEL, BASIC     Postoperative day #1. Standing Status:   Standing     Number of Occurrences:   1    HGB AND HCT     Notify surgeon if Hb is less than 9 or Hct is less than 27. Standing Status:   Standing     Number of Occurrences:   3    METABOLIC PANEL, BASIC     Standing Status:   Standing     Number of Occurrences:   3    CBC WITH AUTOMATED DIFF     Standing Status:   Standing     Number of Occurrences:   3    DRUG SCREEN, URINE     Standing Status:   Standing     Number of Occurrences:   1    MAGNESIUM     Standing Status:   Standing     Number of Occurrences:   1    DIET DIABETIC CONSISTENT CARB Regular     Standing Status:   Standing     Number of Occurrences:   1     Order Specific Question:   Texture: Answer:   Regular    NURSING-MISCELLANEOUS: SEE HYPOGLYCEMIA PROTOCOL BELOW HYPOGLYCEMIA PROTOCOL: Initiate Hypoglycemia protocol if blood glucose is less than 70 mg/dL FOR CONSCIOUS PATIENT: Administer 4 ounces fruit juice OR regular soda OR 4 glucose tablets. FOR UN. ..      HYPOGLYCEMIA PROTOCOL:    Initiate Hypoglycemia protocol if blood glucose is less than 70 mg/dL    FOR CONSCIOUS PATIENT: Administer 4 ounces fruit juice OR regular soda OR 4 glucose tablets. FOR UNCONSCIOUS OR CHANGE-IN-MENTAL-STATUS PATIENT: If blood glucose is greater than or equal to 50 mg/dL administer     25 mL of 50% dextrose solution IV push, if blood glucose is less than 50 mg/dL, administer 50 mL IV push. If no IV, administer Glucagon     1 mg IM. Following Hypoglycemic Protocol: Once patient is fully alert and BG greater than 80 mg/dL provide a small snack,  if NPO consider IV fluids with dextrose. Repeat finger stick blood glucose in 15 minutes AFTER treatment, if less than 80 mg/dL repeat hypoglycemic protocol and notify provider. Document all interventions in the Electronic Medical  Record (EMR). Standing Status:   Standing     Number of Occurrences:   1     Order Specific Question:   Description of Order:     Answer:   SEE HYPOGLYCEMIA PROTOCOL BELOW    NURSING-MISCELLANEOUS: Blood glucose targets -- ICU: 140 - 180 mg/dL;  NON-ICU: 100 - 140 mg/dL CONTINUOUS     Standing Status:   Standing     Number of Occurrences:   1     Order Specific Question:   Description of Order:     Answer:   Blood glucose targets -- ICU: 140 - 180 mg/dL;  NON-ICU: 100 - 140 mg/dL    VITAL SIGNS     Standing Status:   Standing     Number of Occurrences:   1    WEIGHT BEARING: SPECIFY Toe touch weight bearing left LE  CONTINUOUS Routine     Toe touch weight bearing left LE     Standing Status:   Standing     Number of Occurrences:   1    OUT OF BED IN CHAIR     On post-operative day #1 (a minimum of two times daily) as tolerated per weight bearing status     Standing Status:   Standing     Number of Occurrences:   1    CHECK PULSES     Peripheral pulse assessment. Standing Status:   Standing     Number of Occurrences:   1    APPLY ICE TO SPECIFIED AREA     Apply cold therapy to operative hip. Standing Status:   Standing     Number of Occurrences:   1    INCENTIVE SPIROMETRY     10 times per hour while awake.      Standing Status: Standing     Number of Occurrences:   1    VITAL SIGNS     Per unit routine     Standing Status:   Standing     Number of Occurrences:   1    ACTIVITY AS TOLERATED W/ASSIST     Standing Status:   Standing     Number of Occurrences:   1    WEIGH PATIENT     Standing Status:   Standing     Number of Occurrences:   1    INTAKE AND OUTPUT     Call for urine ouput less than 120ml in 4 hours     Standing Status:   Standing     Number of Occurrences:   1    NURSING-MISCELLANEOUS: Palpate, scan or straight cath and document bladder residual as needed CONTINUOUS     Standing Status:   Standing     Number of Occurrences:   1     Order Specific Question:   Description of Order:     Answer:   Palpate, scan or straight cath and document bladder residual as needed    STRAIGHT CATHETER (NURSING) PRN Routine PRN inability to void. If necessary to cath a second time, use indwelling urinary catheter, if residual greater than 300 ml leave in and discontinue in 24 hours. PRN inability to void. If necessary to cath a second time, use indwelling urinary catheter, if residual greater than 300 ml leave in and discontinue in 24 hours. Standing Status:   Standing     Number of Occurrences:   52795    INCENTIVE SPIROMETRY     Standing Status:   Standing     Number of Occurrences:   1    APPLY/MAINTAIN SEQUENTIAL COMPRESSION DEVICE     When appropriate prophylactic therapy cannot be provided due to patient limitations, serial screening Duplex Doppler Ultrasound studies of the legs may be considered to assess for development of DVT.      Standing Status:   Standing     Number of Occurrences:   1    POC GLUCOSE     Standing Status:   Standing     Number of Occurrences:   61    MUJICA CATH, DISCONTINUE     Standing Status:   Standing     Number of Occurrences:   1    FULL CODE     Standing Status:   Standing     Number of Occurrences:   1    OT--EVAL, DEVISE PLAN OF CARE AND TREAT     Standing Status:   Standing     Number of Occurrences:   1    IP CONSULT TO PHYSICAL THERAPY     Standing Status:   Standing     Number of Occurrences:   34    PT--EVAL, DEVI PLAN OF CARE AND TREAT     Standing Status:   Standing     Number of Occurrences:   1    GLUCOSE, POC     Standing Status:   Standing     Number of Occurrences:   1    GLUCOSE, POC     Standing Status:   Standing     Number of Occurrences:   1    GLUCOSE, POC     Standing Status:   Standing     Number of Occurrences:   1    GLUCOSE, POC     Standing Status:   Standing     Number of Occurrences:   1    GLUCOSE, POC     Standing Status:   Standing     Number of Occurrences:   1    GLUCOSE, POC     Standing Status:   Standing     Number of Occurrences:   1    GLUCOSE, POC     Standing Status:   Standing     Number of Occurrences:   1    GLUCOSE, POC     Standing Status:   Standing     Number of Occurrences:   1    GLUCOSE, POC     Standing Status:   Standing     Number of Occurrences:   1    GLUCOSE, POC     Standing Status:   Standing     Number of Occurrences:   1    EKG, 12 LEAD, SUBSEQUENT     Standing Status:   Standing     Number of Occurrences:   1     Order Specific Question:   Reason for Exam:     Answer:   fall    STRAIGHT CATHETER, INSERTION     Standing Status:   Standing     Number of Occurrences:   06950    DISCHARGE PATIENT     Discharge home after cleared by [facility] Standing Status:   Standing     Number of Occurrences:   1    lisinopriL (PRINIVIL, ZESTRIL) 10 mg tablet     Sig: Take  by mouth daily.  glipiZIDE (GLUCOTROL) 10 mg tablet     Sig: Take 10 mg by mouth two (2) times a day.  metFORMIN (GLUCOPHAGE) 1,000 mg tablet     Sig: Take 1,000 mg by mouth two (2) times daily (with meals).     fentaNYL citrate (PF) injection 25 mcg    ondansetron (ZOFRAN) injection 4 mg    labetaloL (NORMODYNE;TRANDATE) 20 mg/4 mL (5 mg/mL) injection 10 mg    DISCONTD: sodium chloride (NS) flush 5-40 mL    DISCONTD: sodium chloride (NS) flush 5-40 mL  DISCONTD: fentaNYL citrate (PF) injection 50 mcg    DISCONTD: HYDROmorphone (PF) (DILAUDID) injection 0.5 mg    DISCONTD: naloxone (NARCAN) injection 0.1 mg    DISCONTD: flumazeniL (ROMAZICON) 0.1 mg/mL injection 0.2 mg    DISCONTD: prochlorperazine (COMPAZINE) injection 5 mg    DISCONTD: glucose chewable tablet 16 g    DISCONTD: glucagon (GLUCAGEN) injection 1 mg    DISCONTD: dextrose (D50W) injection syrg 12.5-25 g    insulin lispro (HUMALOG) injection    glucose chewable tablet 16 g    glucagon (GLUCAGEN) injection 1 mg    dextrose (D50W) injection syrg 12.5-25 g    DISCONTD: ceFAZolin (ANCEF) 1 g in sterile water (preservative free) 10 mL IV syringe     Order Specific Question:   Antibiotic Indications     Answer:   Surgical Prophylaxis    ceFAZolin (ANCEF) 2 g/20 mL in sterile water IV syringe     Order Specific Question:   Antibiotic Indications     Answer:   Surgical Prophylaxis    DISCONTD: 0.9% sodium chloride 500 mL Irrigation    sodium chloride (NS) flush 5-40 mL    sodium chloride (NS) flush 5-40 mL    acetaminophen (TYLENOL) tablet 650 mg    oxyCODONE-acetaminophen (PERCOCET) 5-325 mg per tablet 2 Tab    HYDROmorphone (DILAUDID) tablet 2 mg    DISCONTD: HYDROmorphone (PF) (DILAUDID) injection 1 mg    naloxone (NARCAN) injection 0.4 mg    ceFAZolin (ANCEF) 2 g/20 mL in sterile water IV syringe     Order Specific Question:   Antibiotic Indications     Answer:   Surgical Prophylaxis    ondansetron (ZOFRAN) injection 4 mg    senna (SENOKOT) tablet 8.6 mg    enoxaparin (LOVENOX) injection 40 mg    DISCONTD: glipiZIDE (GLUCOTROL) tablet 10 mg     OP SIG:Take 10 mg by mouth two (2) times a day.  lisinopriL (PRINIVIL, ZESTRIL) tablet 10 mg     OP SIG:Take  by mouth daily.  DISCONTD: metFORMIN (GLUCOPHAGE) tablet 1,000 mg     OP SIG:Take 1,000 mg by mouth two (2) times daily (with meals).       hydrALAZINE (APRESOLINE) 20 mg/mL injection 10 mg    nicotine (NICODERM CQ) 14 mg/24 hr patch 1 Patch    DISCONTD: sodium chloride (NS) flush 5-40 mL    DISCONTD: sodium chloride (NS) flush 5-40 mL    OR Linked Order Group     acetaminophen (TYLENOL) tablet 650 mg     acetaminophen (TYLENOL) suppository 650 mg    bisacodyL (DULCOLAX) suppository 10 mg    OR Linked Order Group     promethazine (PHENERGAN) tablet 12.5 mg     ondansetron (ZOFRAN) injection 4 mg    DISCONTD: enoxaparin (LOVENOX) injection 40 mg    DISCONTD: insulin glargine (LANTUS) injection 5 Units    insulin glargine (LANTUS) injection 10 Units    oxyCODONE-acetaminophen (PERCOCET) 5-325 mg per tablet     Sig: Take 1-2 Tabs by mouth every four (4) hours as needed for Pain for up to 10 days. Max Daily Amount: 12 Tabs. Dispense:  84 Tab     Refill:  0    aspirin (ASPIRIN) 325 mg tablet     Sig: Take 2 pills 2 x day for 6 weeks     Dispense:  120 Tab     Refill:  1    naloxone (NARCAN) 4 mg/actuation nasal spray     Sig: Use 1 spray intranasally, then discard. Repeat with new spray every 2 min as needed for opioid overdose symptoms, alternating nostrils. Dispense:  1 Each     Refill:  0    magnesium sulfate 2 g/50 ml IVPB (premix or compounded)    magnesium sulfate 1 g/100 ml IVPB (premix or compounded)    IP CONSULT TO HOSPITALIST     Standing Status:   Standing     Number of Occurrences:   1     Order Specific Question:   Reason for Consult: Answer:   TO ADMIT     Order Specific Question:   Did you call or speak to the consulting provider? Answer:   No     Order Specific Question:   Consult To     Answer:   hospitalst     Order Specific Question:   Schedule When? Answer:   TODAY    IP CONSULT TO ORTHOPEDIC SURGERY     Standing Status:   Standing     Number of Occurrences:   1     Order Specific Question:   Reason for Consult: Answer:   ortho     Order Specific Question:   Did you call or speak to the consulting provider?      Answer:   No     Order Specific Question:   Consult To Answer:   orthopedic     Order Specific Question:   Schedule When? Answer:   TODAY    INITIAL PHYSICIAN ORDER: INPATIENT Surgical; No; 3. Patient receiving treatment that can only be provided in an inpatient setting (further clarification in H&P documentation)     Standing Status:   Standing     Number of Occurrences:   1     Order Specific Question:   Status: Answer:   INPATIENT [101]     Order Specific Question:   Type of Bed     Answer:   Surgical [18]     Order Specific Question:   Cardiac Monitoring Required? Answer:   No     Order Specific Question:   Inpatient Hospitalization Certified Necessary for the Following Reasons     Answer:   3. Patient receiving treatment that can only be provided in an inpatient setting (further clarification in H&P documentation)     Order Specific Question:   Admitting Diagnosis     Answer:   Closed left hip fracture [facility]) [[id number]]     Order Specific Question:   Admitting Physician     Answer:   Cleve Carrizales     Order Specific Question:   Attending Physician     Answer:   Cleve Carrizales     Order Specific Question:   Estimated Length of Stay     Answer:   3-4 Midnights     Order Specific Question:   Discharge Plan:     Answer:   [address] arrangements     Standing Status:   Standing     Number of Occurrences:   1     Order Specific Question:   Reasons for Consult:      Answer:   Assistance with Discharge Planning       Labs -     Recent Results (from the past 12 hour(s))   METABOLIC PANEL, BASIC    Collection Time: 05/10/21  5:37 AM   Result Value Ref Range    Sodium 140 136 - 145 mmol/L    Potassium 4.6 3.5 - 5.5 mmol/L    Chloride 103 100 - 111 mmol/L    CO2 32 21 - 32 mmol/L    Anion gap 5 3.0 - 18 mmol/L    Glucose 75 74 - 99 mg/dL    BUN 7 7.0 - 18 MG/DL    Creatinine 0.42 (L) 0.6 - 1.3 MG/DL    BUN/Creatinine ratio 17 12 - 20      GFR est AA >60 >60 ml/min/1.73m2    GFR est non-AA >60 >60 ml/min/1.73m2    Calcium 8.8 8.5 - 10.1 MG/DL   CBC WITH AUTOMATED DIFF    Collection Time: 05/10/21  5:37 AM   Result Value Ref Range    WBC 10.8 4.6 - 13.2 K/uL    RBC 3.02 (L) 4.20 - 5.30 M/uL    HGB 10.3 (L) 12.0 - 16.0 g/dL    HCT 31.5 (L) 35.0 - 45.0 %    .3 (H) 74.0 - 97.0 FL    MCH 34.1 (H) 24.0 - 34.0 PG    MCHC 32.7 31.0 - 37.0 g/dL    RDW 13.3 11.6 - 14.5 %    PLATELET 794 165 - 914 K/uL    MPV 9.5 9.2 - 11.8 FL    NEUTROPHILS 69 40 - 73 %    LYMPHOCYTES 15 (L) 21 - 52 %    MONOCYTES 14 (H) 3 - 10 %    EOSINOPHILS 0 0 - 5 %    BASOPHILS 0 0 - 2 %    ABS. NEUTROPHILS 7.5 1.8 - 8.0 K/UL    ABS. LYMPHOCYTES 1.6 0.9 - 3.6 K/UL    ABS. MONOCYTES 1.5 (H) 0.05 - 1.2 K/UL    ABS. EOSINOPHILS 0.0 0.0 - 0.4 K/UL    ABS. BASOPHILS 0.0 0.0 - 0.1 K/UL    DF AUTOMATED     MAGNESIUM    Collection Time: 05/10/21  5:37 AM   Result Value Ref Range    Magnesium 1.7 1.6 - 2.6 mg/dL   GLUCOSE, POC    Collection Time: 05/10/21  6:07 AM   Result Value Ref Range    Glucose (POC) 78 70 - 110 mg/dL   GLUCOSE, POC    Collection Time: 05/10/21  9:17 AM   Result Value Ref Range    Glucose (POC) 98 70 - 110 mg/dL   GLUCOSE, POC    Collection Time: 05/10/21 12:11 PM   Result Value Ref Range    Glucose (POC) 83 70 - 110 mg/dL       Radiologic Studies -   NC XR TECHNOLOGIST SERVICE   Final Result   Fluoroscopy was provided for this procedure under the supervision   and/or direction of the attending provider. ? For further information regarding   this procedure, see patient medical record. XR CHEST PORT   Final Result      Left posterior rib fracture appears acute, without significant displacement. Possible eighth rib fracture as well. No pneumothorax. XR HIP LT W OR WO PELV 2-3 VWS   Final Result      Intertrochanteric fracture of left hip. CT Results  (Last 48 hours)    None        CXR Results  (Last 48 hours)    None          Disposition     Disposition:  Admit    CLINICAL IMPRESSION:    1.  Closed fracture of multiple ribs of left side, initial encounter    2. Closed fracture of left hip, initial encounter ([facility] [address]    3. Fall from standing, initial encounter        It should be noted that I will be the provider of record for this patient  Cuauhtemoc Gupta MD    Follow-up Information     Follow up With Specialties Details Why Contact Info    Irineo Bolden, AIME Nurse Practitioner   [address] [facility]  [phone]      Hannah Morelos MD Orthopedic Surgery On 5/18/2021 Follow up appointment scheduled for May 18, 2021 at 11:00 a.m. Please wear mask to office visit. [address] [employer] [place] [facility]            Current Discharge Medication List      START taking these medications    Details   oxyCODONE-acetaminophen (PERCOCET) 5-325 mg per tablet Take 1-2 Tabs by mouth every four (4) hours as needed for Pain for up to 10 days. Max Daily Amount: 12 Tabs. Qty: 80 Tab, Refills: 0  Start date: 5/10/2021, End date: 5/20/2021    Associated Diagnoses: Closed fracture of left hip, initial encounter ([facility] [address]      aspirin (ASPIRIN) 325 mg tablet Take 2 pills 2 x day for 6 weeks  Qty: 120 Tab, Refills: 1  Start date: 5/10/2021      naloxone [facility]) 4 mg/actuation nasal spray Use 1 spray intranasally, then discard. Repeat with new spray every 2 min as needed for opioid overdose symptoms, alternating nostrils. Qty: 1 Each, Refills: 0  Start date: 5/10/2021         CONTINUE these medications which have NOT CHANGED    Details   lisinopriL (PRINIVIL, ZESTRIL) 10 mg tablet Take  by mouth daily. glipiZIDE (GLUCOTROL) 10 mg tablet Take 10 mg by mouth two (2) times a day. metFORMIN (GLUCOPHAGE) 1,000 mg tablet Take 1,000 mg by mouth two (2) times daily (with meals). Please note that this dictation was completed with shipbeat, the [employer] voice recognition software.   Quite often unanticipated grammatical, syntax, homophones, and other interpretive errors are inadvertently transcribed by the computer software. Please disregard these errors. Please excuse any errors that have escaped final proofreading.

## 2021-05-08 NOTE — ANESTHESIA PROCEDURE NOTES
Spinal Block    Start time: 5/8/2021 2:23 PM  End time: 5/8/2021 2:25 PM  Performed by: Lizzy Anderson MD  Authorized by: Lizzy Anderson MD     Pre-procedure: Indications: primary anesthetic  Preanesthetic Checklist: risks and benefits discussed, site marked and timeout performed      Spinal Block:   Patient Position:  Right lateral decubitus  Prep Region:  Lumbar  Prep: chlorhexidine      Location:  L3-4  Technique:  Single shot        Needle:   Needle Type:  Pencil-tip  Needle Gauge:  25 G  Attempts:  1      Events: CSF confirmed, no blood with aspiration and no paresthesia        Assessment:  Insertion:  Uncomplicated    Immediately post spinal pt placed supine w left tilt.

## 2021-05-08 NOTE — ANESTHESIA PREPROCEDURE EVALUATION
Relevant Problems   No relevant active problems       Anesthetic History   No history of anesthetic complications            Review of Systems / Medical History  Patient summary reviewed, nursing notes reviewed and pertinent labs reviewed    Pulmonary          Smoker         Neuro/Psych   Within defined limits           Cardiovascular    Hypertension              Exercise tolerance: >4 METS     GI/Hepatic/Renal  Within defined limits              Endo/Other    Diabetes: well controlled         Other Findings              Physical Exam    Airway  Mallampati: II  TM Distance: 4 - 6 cm    Mouth opening: Normal     Cardiovascular               Dental    Dentition: Full upper dentures and Full lower dentures     Pulmonary                 Abdominal  GI exam deferred       Other Findings            Anesthetic Plan    ASA: 2, emergent  Anesthesia type: spinal          Induction: Intravenous  Anesthetic plan and risks discussed with: Patient

## 2021-05-08 NOTE — ANESTHESIA POSTPROCEDURE EVALUATION
Post-Anesthesia Evaluation and Assessment    Cardiovascular Function/Vital Signs  Visit Vitals  BP (!) 144/63   Pulse 77   Temp 36.9 °C (98.4 °F)   Resp 22   Wt 56.7 kg (125 lb)   SpO2 100%       Patient is status post Procedure(s):  OPEN REDUCTION INTERNAL FIXATION LEFT HIP WITH C-ARM. Nausea/Vomiting: Controlled. Postoperative hydration reviewed and adequate. Pain:  Pain Scale 1: Numeric (0 - 10) (05/08/21 1636)  Pain Intensity 1: 0 (05/08/21 1636)   Managed. Neurological Status:   Neuro (WDL): Within Defined Limits (05/08/21 1630)   At baseline. Mental Status and Level of Consciousness: Arousable. Pulmonary Status:   O2 Device: Nasal cannula (05/08/21 1636)   Adequate oxygenation and airway patent. Complications related to anesthesia: None    Post-anesthesia assessment completed. No concerns. Patient has met all discharge requirements.     Signed By: Ricardo Diaz CRNA    May 8, 2021

## 2021-05-08 NOTE — PERIOP NOTES
TRANSFER - OUT REPORT:    Verbal report given to Juan R Jamison RN(name) on Diya Desir  being transferred to (unit) for routine post - op       Report consisted of patients Situation, Background, Assessment and   Recommendations(SBAR). Information from the following report(s) SBAR, Intake/Output and Recent Results was reviewed with the receiving nurse. Lines:   Peripheral IV 05/08/21 Right Wrist (Active)   Site Assessment Clean, dry, & intact 05/08/21 1707   Phlebitis Assessment 0 05/08/21 1707   Infiltration Assessment 0 05/08/21 1707   Dressing Status Clean, dry, & intact 05/08/21 1707   Dressing Type Tape;Transparent 05/08/21 1707   Hub Color/Line Status Infusing 05/08/21 1707        Opportunity for questions and clarification was provided. Patient transported with:   O2 @ 2 liters  Registered Nurse     Dressing to left hip clean dry and intact    All patient belongings to include clothes, shoes, eyeglasses and purse wit cell phone with patient at time of transfer.

## 2021-05-08 NOTE — ED NOTES
TRANSFER - OUT REPORT:    Verbal report given to Elyssa Enriquez (name) on Dorothyann Second  being transferred to Surgical (unit) for routine progression of care       Report consisted of patients Situation, Background, Assessment and   Recommendations(SBAR). Information from the following report(s) SBAR, ED Summary, MAR, Recent Results and Med Rec Status was reviewed with the receiving nurse. Lines:   Peripheral IV 05/08/21 Right Wrist (Active)   Site Assessment Clean, dry, & intact 05/08/21 1336   Phlebitis Assessment 0 05/08/21 1336   Infiltration Assessment 0 05/08/21 1336   Dressing Status Clean, dry, & intact 05/08/21 1336   Dressing Type Transparent 05/08/21 1336   Hub Color/Line Status Green 05/08/21 1232        Opportunity for questions and clarification was provided.       Patient transported with:   Registered Nurse

## 2021-05-08 NOTE — H&P
History & Physical    Patient: Lupillo Nur MRN: [id number]  CSN: [id number]    YOB: 1950  Age: 79 y.o. Sex: female      DOA: 5/8/2021  Primary Care Provider:  Low Cardenas NP      Assessment/Plan     Hospital Problems  Date Reviewed: 5/8/2021          Codes Class Noted POA    * (Principal) Closed left hip fracture [facility]) ICD-10-CM: I99.896G  ICD-9-CM: 820.8  5/8/2021 Unknown        Type 2 diabetes mellitus, without long-term current use of insulin (HCC) ICD-10-CM: E11.9  ICD-9-CM: 250.00  Unknown         Hypertension ICD-10-CM: I10  ICD-9-CM: 401.9  Unknown Unknown        Closed fracture of rib ICD-10-CM: S22.39XA  ICD-9-CM: 807.00  Unknown Unknown                Admit to medical     Closed left hip fracture  Pain control, also PDX on board for surgery today  Fall precaution, PT OT    Close to fracture of rib  Pain control, incentive spirometry to prevent pneumonia    Hypertension  Elevated in ER, improving after labetalol IV  Continue lisinopril twice a day at home    Diabetes  Type II, hold p.o. medication, low-dose Lantus sliding scale    Smoker  Nicotine patch          Estimate  length of stay : 2-3 day    DVT : heparin for DVT prophylaxis  CC: Hip pain       HPI:     Lupillo Nur is a 79 y.o. female with history of diabetes, hypertension, smoker presented to ER after fall. She tripped per her coworker, she fell on the floor. Unable to stand up after fall. X-ray indicated  Left posterior rib fracture appears acute andIntertrochanteric fracture of left hip. Orthopedics was consulted per ER physician. She denies any COVID-19 exposure. Not receiving vaccine-she does not want to be vaccinated. BP was elevated in ER. Pain meds and labetalol were administrated.  No chest pain, no sob,     Visit Vitals  BP (!) 174/70   Pulse 96   Temp 97.8 °F (36.6 °C)   Resp 20   Wt 56.7 kg (125 lb)   SpO2 96%      O2 Device: None (Room air)      Past Medical History:   Diagnosis Date    Diabetes ([facility] [address]     Hypertension      Surgical History    Surgery Date Site/Laterality Comments   HYSTERECTOMY        Family History    Relation Name Status Comments   Father        Mother               Social History     Socioeconomic History    Marital status:      Spouse name: Not on file    Number of children: Not on file    Years of education: Not on file    Highest education level: Not on file       Prior to Admission medications    Medication Sig Start Date End Date Taking? Authorizing Provider   lisinopriL (PRINIVIL, ZESTRIL) 10 mg tablet Take  by mouth daily. Yes Abner, MD Seema   glipiZIDE (GLUCOTROL) 10 mg tablet Take 10 mg by mouth two (2) times a day. Yes Abner, MD Seema   metFORMIN (GLUCOPHAGE) 1,000 mg tablet Take 1,000 mg by mouth two (2) times daily (with meals). Yes Abner, MD Seema       No Known Allergies    Review of Systems  Gen: No fever, chills, malaise, weight loss/gain. Heent: No headache, rhinorrhea, epistaxis, ear pain, hearing loss, sinus pain, neck pain/stiffness, sore throat. Heart: No chest pain, palpitations, GARCIA, pnd, or orthopnea. Resp: No cough, hemoptysis, wheezing and shortness of breath. GI: No nausea, vomiting, diarrhea, constipation, melena or hematochezia. : No urinary obstruction, dysuria or hematuria. Derm: No rash, new skin lesion or pruritis. Musc/skeletal: left hip pain   Vasc: No edema, cyanosis or claudication. Endo: No heat/cold intolerance, no polyuria,polydipsia or polyphagia. Neuro: No unilateral weakness, numbness, tingling. No seizures. Heme: No easy bruising or bleeding.           Physical Exam:     Physical Exam:  Visit Vitals  BP (!) 174/70   Pulse 96   Temp 97.8 °F (36.6 °C)   Resp 20   Wt 56.7 kg (125 lb)   SpO2 96%      O2 Device: None (Room air)    Temp (24hrs), Av.8 °F (36.6 °C), Min:97.8 °F (36.6 °C), Max:97.8 °F (36.6 °C)     0701 -  1900  In: 1000 [I.V.:1000]  Out: 1150 [Urine:1100] No intake/output data recorded. General:  Awake, cooperative, no distress. Head:  Normocephalic, without obvious abnormality, atraumatic. Eyes:  Conjunctivae/corneas clear, sclera anicteric, PERRL, EOMs intact. Nose: Nares normal. No drainage or sinus tenderness. Throat: Lips, mucosa, and tongue normal. .   Neck: Supple, symmetrical, trachea midline, no adenopathy. Lungs:   Clear to auscultation bilaterally. Heart:  Regular rate and rhythm, S1, S2 normal, no murmur, click, rub or gallop. Abdomen: Soft, non-tender. Bowel sounds normal. No masses,  No organomegaly. Extremities: Extremities normal, atraumatic, no cyanosis or edema. Left hip shorter than rt one, left hip rom limited due to the pain    Pulses: 2+ and symmetric all extremities. Skin: Skin color-pink, texture, turgor normal. No rashes or lesions. Capillary refill normal    Neurologic: CNII-XII intact. No focal motor or sensory deficit.        Labs Reviewed:    BMP:   Lab Results   Component Value Date/Time     05/08/2021 11:30 AM    K 4.1 05/08/2021 11:30 AM     05/08/2021 11:30 AM    CO2 24 05/08/2021 11:30 AM    AGAP 9 05/08/2021 11:30 AM     (H) 05/08/2021 11:30 AM    BUN 7 05/08/2021 11:30 AM    CREA 0.57 (L) 05/08/2021 11:30 AM    GFRAA >60 05/08/2021 11:30 AM    GFRNA >60 05/08/2021 11:30 AM     CMP:   Lab Results   Component Value Date/Time     05/08/2021 11:30 AM    K 4.1 05/08/2021 11:30 AM     05/08/2021 11:30 AM    CO2 24 05/08/2021 11:30 AM    AGAP 9 05/08/2021 11:30 AM     (H) 05/08/2021 11:30 AM    BUN 7 05/08/2021 11:30 AM    CREA 0.57 (L) 05/08/2021 11:30 AM    GFRAA >60 05/08/2021 11:30 AM    GFRNA >60 05/08/2021 11:30 AM    CA 8.4 (L) 05/08/2021 11:30 AM     CBC:   Lab Results   Component Value Date/Time    WBC 8.7 05/08/2021 11:30 AM    HGB 12.4 05/08/2021 11:30 AM    HCT 38.0 05/08/2021 11:30 AM     05/08/2021 11:30 AM     All Cardiac Markers in the last 24 hours: No results found for: CPK, CK, CKMMB, CKMB, RCK3, CKMBT, CKNDX, CKND1, DIONICIO, TROPT, TROIQ, RIMMA, TROPT, TNIPOC, BNP, BNPP  Recent Glucose Results:   Lab Results   Component Value Date/Time     (H) 05/08/2021 11:30 AM     ABG: No results found for: PH, PHI, PCO2, PCO2I, PO2, PO2I, HCO3, HCO3I, FIO2, FIO2I  COAGS: No results found for: APTT, PTP, INR, INREXT, INREXT  Liver Panel: No results found for: ALB, CBIL, TBIL, TP, GLOB, AGRAT, ASTPOC, ALTPOC, ALT, AP  Pancreatic Markers: No results found for: AMYLPOCT, AML, LIPPOCT, LPSE    Xr Hip Lt W Or Wo Pelv 2-3 Vws    Result Date: 5/8/2021  EXAM: LEFT HIP RADIOGRAPHS CLINICAL INDICATION/HISTORY: fall -Additional: Fall with left hip injury and pain. COMPARISON: None TECHNIQUE: AP pelvis and frog-leg lateral view of left hip. _______________ FINDINGS: BONES: Poorly visualized intertrochanteric fracture of the left hip. Subtle obliquely oriented lucency and foreshortening and mild angulation on the frontal view. Crosstable lateral view limited by body habitus. Bilateral DJD with superior acetabular osteophytosis. SOFT TISSUES: Vascular calcifications. _______________     Intertrochanteric fracture of left hip. Xr Chest Port    Result Date: 5/8/2021  EXAM: FRONTAL CHEST RADIOGRAPH CLINICAL INDICATION/HISTORY: Left hip fracture. Preoperative evaluation. COMPARISON: None TECHNIQUE: Frontal view of the chest _______________ FINDINGS: HEART AND MEDIASTINUM: Unremarkable. LUNGS AND PLEURAL SPACES: Unremarkable. BONY THORAX AND SOFT TISSUES: Minimally displaced fracture left posterolateral seventh rib. Possible adjacent eighth rib fracture without displacement. _______________     Left posterior rib fracture appears acute, without significant displacement. Possible eighth rib fracture as well. No pneumothorax.      Procedures/imaging: see electronic medical records for all procedures/Xrays and details which were not copied into this note but were reviewed prior to creation of Gilberto Sotelo MD, Internal Medicine     CC: Megan Soulier, NP

## 2021-05-09 LAB
ANION GAP SERPL CALC-SCNC: 6 MMOL/L (ref 3–18)
ATRIAL RATE: 99 BPM
BASOPHILS # BLD: 0 K/UL (ref 0–0.1)
BASOPHILS NFR BLD: 0 % (ref 0–2)
BUN SERPL-MCNC: 7 MG/DL (ref 7–18)
BUN/CREAT SERPL: 14 (ref 12–20)
CALCIUM SERPL-MCNC: 7.9 MG/DL (ref 8.5–10.1)
CALCULATED P AXIS, ECG09: 83 DEGREES
CALCULATED R AXIS, ECG10: -33 DEGREES
CALCULATED T AXIS, ECG11: 88 DEGREES
CHLORIDE SERPL-SCNC: 103 MMOL/L (ref 100–111)
CO2 SERPL-SCNC: 29 MMOL/L (ref 21–32)
CREAT SERPL-MCNC: 0.51 MG/DL (ref 0.6–1.3)
DIAGNOSIS, 93000: NORMAL
DIFFERENTIAL METHOD BLD: ABNORMAL
EOSINOPHIL # BLD: 0 K/UL (ref 0–0.4)
EOSINOPHIL NFR BLD: 0 % (ref 0–5)
ERYTHROCYTE [DISTWIDTH] IN BLOOD BY AUTOMATED COUNT: 13.1 % (ref 11.6–14.5)
GLUCOSE BLD STRIP.AUTO-MCNC: 106 MG/DL (ref 70–110)
GLUCOSE BLD STRIP.AUTO-MCNC: 184 MG/DL (ref 70–110)
GLUCOSE BLD STRIP.AUTO-MCNC: 238 MG/DL (ref 70–110)
GLUCOSE BLD STRIP.AUTO-MCNC: 266 MG/DL (ref 70–110)
GLUCOSE SERPL-MCNC: 191 MG/DL (ref 74–99)
HCT VFR BLD AUTO: 32.9 % (ref 35–45)
HGB BLD-MCNC: 10.7 G/DL (ref 12–16)
LYMPHOCYTES # BLD: 1 K/UL (ref 0.9–3.6)
LYMPHOCYTES NFR BLD: 9 % (ref 21–52)
MCH RBC QN AUTO: 33.4 PG (ref 24–34)
MCHC RBC AUTO-ENTMCNC: 32.5 G/DL (ref 31–37)
MCV RBC AUTO: 102.8 FL (ref 74–97)
MONOCYTES # BLD: 1.1 K/UL (ref 0.05–1.2)
MONOCYTES NFR BLD: 10 % (ref 3–10)
NEUTS SEG # BLD: 9 K/UL (ref 1.8–8)
NEUTS SEG NFR BLD: 81 % (ref 40–73)
P-R INTERVAL, ECG05: 152 MS
PLATELET # BLD AUTO: 207 K/UL (ref 135–420)
PMV BLD AUTO: 9.4 FL (ref 9.2–11.8)
POTASSIUM SERPL-SCNC: 3.9 MMOL/L (ref 3.5–5.5)
Q-T INTERVAL, ECG07: 366 MS
QRS DURATION, ECG06: 92 MS
QTC CALCULATION (BEZET), ECG08: 469 MS
RBC # BLD AUTO: 3.2 M/UL (ref 4.2–5.3)
SODIUM SERPL-SCNC: 138 MMOL/L (ref 136–145)
VENTRICULAR RATE, ECG03: 99 BPM
WBC # BLD AUTO: 11.2 K/UL (ref 4.6–13.2)

## 2021-05-09 PROCEDURE — 97530 THERAPEUTIC ACTIVITIES: CPT

## 2021-05-09 PROCEDURE — 74011250637 HC RX REV CODE- 250/637: Performed by: ORTHOPAEDIC SURGERY

## 2021-05-09 PROCEDURE — 74011636637 HC RX REV CODE- 636/637: Performed by: HOSPITALIST

## 2021-05-09 PROCEDURE — 36415 COLL VENOUS BLD VENIPUNCTURE: CPT

## 2021-05-09 PROCEDURE — 65270000029 HC RM PRIVATE

## 2021-05-09 PROCEDURE — 97162 PT EVAL MOD COMPLEX 30 MIN: CPT

## 2021-05-09 PROCEDURE — 74011250636 HC RX REV CODE- 250/636: Performed by: HOSPITALIST

## 2021-05-09 PROCEDURE — 85025 COMPLETE CBC W/AUTO DIFF WBC: CPT

## 2021-05-09 PROCEDURE — 82962 GLUCOSE BLOOD TEST: CPT

## 2021-05-09 PROCEDURE — 80048 BASIC METABOLIC PNL TOTAL CA: CPT

## 2021-05-09 PROCEDURE — 74011250636 HC RX REV CODE- 250/636: Performed by: ORTHOPAEDIC SURGERY

## 2021-05-09 PROCEDURE — 74011000250 HC RX REV CODE- 250: Performed by: ORTHOPAEDIC SURGERY

## 2021-05-09 RX ORDER — INSULIN GLARGINE 100 [IU]/ML
10 INJECTION, SOLUTION SUBCUTANEOUS
Status: DISCONTINUED | OUTPATIENT
Start: 2021-05-09 | End: 2021-05-12 | Stop reason: HOSPADM

## 2021-05-09 RX ADMIN — OXYCODONE HYDROCHLORIDE AND ACETAMINOPHEN 2 TABLET: 5; 325 TABLET ORAL at 03:56

## 2021-05-09 RX ADMIN — INSULIN LISPRO 6 UNITS: 100 INJECTION, SOLUTION INTRAVENOUS; SUBCUTANEOUS at 00:17

## 2021-05-09 RX ADMIN — INSULIN LISPRO 2 UNITS: 100 INJECTION, SOLUTION INTRAVENOUS; SUBCUTANEOUS at 05:53

## 2021-05-09 RX ADMIN — OXYCODONE HYDROCHLORIDE AND ACETAMINOPHEN 2 TABLET: 5; 325 TABLET ORAL at 09:06

## 2021-05-09 RX ADMIN — SENNOSIDES 8.6 MG: 8.6 TABLET, FILM COATED ORAL at 20:51

## 2021-05-09 RX ADMIN — LISINOPRIL 10 MG: 5 TABLET ORAL at 09:06

## 2021-05-09 RX ADMIN — INSULIN LISPRO 4 UNITS: 100 INJECTION, SOLUTION INTRAVENOUS; SUBCUTANEOUS at 12:50

## 2021-05-09 RX ADMIN — SENNOSIDES 8.6 MG: 8.6 TABLET, FILM COATED ORAL at 09:06

## 2021-05-09 RX ADMIN — Medication 10 ML: at 15:09

## 2021-05-09 RX ADMIN — CEFAZOLIN 2 G: 10 INJECTION, POWDER, FOR SOLUTION INTRAVENOUS at 05:47

## 2021-05-09 RX ADMIN — HYDRALAZINE HYDROCHLORIDE 10 MG: 20 INJECTION INTRAMUSCULAR; INTRAVENOUS at 00:28

## 2021-05-09 RX ADMIN — OXYCODONE HYDROCHLORIDE AND ACETAMINOPHEN 2 TABLET: 5; 325 TABLET ORAL at 15:07

## 2021-05-09 RX ADMIN — CEFAZOLIN 2 G: 10 INJECTION, POWDER, FOR SOLUTION INTRAVENOUS at 15:07

## 2021-05-09 RX ADMIN — OXYCODONE HYDROCHLORIDE AND ACETAMINOPHEN 2 TABLET: 5; 325 TABLET ORAL at 20:51

## 2021-05-09 NOTE — PROGRESS NOTES
Progress Note POD #      Patient: Estelle Olivier               Sex: female          DOA: 5/8/2021         YOB: 1950      Surgery: Procedure(s):  OPEN REDUCTION INTERNAL FIXATION LEFT HIP WITH C-ARM           LOS: 1 day               Subjective:     No new complaints    Objective:      Visit Vitals  BP (!) 114/47 (BP 1 Location: Right arm, BP Patient Position: At rest)   Pulse 99   Temp 97.7 °F (36.5 °C)   Resp 16   Wt 57.5 kg (126 lb 12.8 oz)   SpO2 99%       Physical Exam:  Neurological: motor strength: 5/5 in lower extremities bilaterally                          sensation: intact to light touch  Patient mobility  Bed Mobility Training  Rolling: Moderate assistance  Supine to Sit: Minimum assistance, Additional time  Sit to Supine: Minimum assistance, Additional time  Scooting: Stand-by assistance, Additional time  Transfer Training  Sit to Stand: Contact guard assistance, Minimum assistance(vc, bed elevated)  Stand to Sit: Contact guard assistance, Minimum assistance(vc, bed elevated)                   Intake and Output:  Current Shift:  No intake/output data recorded. Last three shifts:  05/07 1901 - 05/09 0700  In: 1500 [I.V.:1500]  Out: 1850 [Urine:1800]    Lab/Data Reviewed:    Lab/Data Reviewed:  Lab Results   Component Value Date/Time    WBC 11.2 05/09/2021 03:35 AM    HGB 10.7 (L) 05/09/2021 03:35 AM    HCT 32.9 (L) 05/09/2021 03:35 AM    PLATELET 886 80/04/0596 03:35 AM    .8 (H) 05/09/2021 03:35 AM     No results found for: APTT  No results found for: INR, PTMR, PTP, PT1, PT2, INREXT         Assessment/Plan     Principal Problem:    Closed left hip fracture ([address] (5/8/2021)    Active Problems:    Type 2 diabetes mellitus, without long-term current use of insulin (HCC) ()      Hypertension ()      Closed fracture of rib ()      Smoking (5/8/2021)        1. Stable  2. OOB with PT  3. D/C Planning  4. Change dressing prior to discharge home.   5. Discharge to home after being cleared by P.T  6. Follow-up in 10 days at [facility] with Dr. Elvie Gordon.

## 2021-05-09 NOTE — PROGRESS NOTES
Weekend coverage    Chart accessed as CM on call. Pt admitted to medical 328 by hospitalist for L hip fx. Ortho following. CM met with pt at bedside to discuss dispo. CM role explained. Pt states she lives alone, works. States she has a strong support group of friends/coworkers to assist at discharge. Has friends, Tonja Mak (at bedside briefly), friend Mariely Link, and coworker Earnstine Klinefelter and kiya Browning Doctor to assist. Contreras Mulligan will drive home at discharge. Pts home address confirmed per face sheet, 3 steps to enter home. Pt denies using DME - will need RW. Pt denies being active with [place] services. FOC offered for [place] and [place] list provided. Pt declines HH at this time stating she has cats and worried about HH in house with cats. May need to readdress at discharge. Awaiting therapy recs. Pt confirms PCP as NP Theodor Client. No immediate dc concerns identified. CM will cont to follow and will be available via hospital  on weekends    Reason for Admission:  L hip fx                     RUR Score:  12%                   Plan for utilizing home health:      TBD, likely will need, declined at this time    PCP: First and Last name:  Adan Leach NP     Name of Practice:    Are you a current patient: Yes/No:    Approximate date of last visit:    Can you participate in a virtual visit with your PCP:                     Current Advanced Directive/Advance Care Plan: Full Code      Healthcare Decision Maker:   Click here to complete [employer] including selection of the Healthcare Decision Maker Relationship (ie \"Primary\")                             Transition of Care Plan:        TBD    Care Management Interventions  PCP Verified by CM: Yes  Mode of Transport at Discharge:  Other (see comment)(friends/coworkers)  Transition of Care Consult (CM Consult): Discharge Planning  Current Support Network: Lives Alone  Confirm Follow Up Transport: Friends  The Patient and/or Patient Representative was Provided with a Choice of Provider and Agrees with the Discharge Plan?: Yes  Name of the Patient Representative Who was Provided with a Choice of Provider and Agrees with the Discharge Plan: patient  Freedom of Choice List was Provided with Basic Dialogue that Supports the Patient's Individualized Plan of Care/Goals, Treatment Preferences and Shares the Quality Data Associated with the Providers?: Yes  Discharge Location  Discharge Placement: Home with family assistance

## 2021-05-09 NOTE — PROGRESS NOTES
Problem: Falls - Risk of  Goal: *Absence of Falls  Description: Document Encisorell Canavan Fall Risk and appropriate interventions in the flowsheet.   Outcome: Progressing Towards Goal  Note: Fall Risk Interventions:  Mobility Interventions: Patient to call before getting OOB    Mentation Interventions: Door open when patient unattended    Medication Interventions: Patient to call before getting OOB    Elimination Interventions: Call light in reach

## 2021-05-09 NOTE — PROGRESS NOTES
Problem: Falls - Risk of  Goal: *Absence of Falls  Description: Document Eduard Dennison Fall Risk and appropriate interventions in the flowsheet.   Outcome: Progressing Towards Goal  Note: Fall Risk Interventions:  Mobility Interventions: Communicate number of staff needed for ambulation/transfer, Patient to call before getting OOB, Utilize walker, cane, or other assistive device    Mentation Interventions: Adequate sleep, hydration, pain control    Medication Interventions: Assess postural VS orthostatic hypotension, Patient to call before getting OOB, Teach patient to arise slowly    Elimination Interventions: Call light in reach, Patient to call for help with toileting needs

## 2021-05-09 NOTE — PROGRESS NOTES
Problem: Mobility Impaired (Adult and Pediatric)  Goal: *Acute Goals and Plan of Care (Insert Text)  Description: Physical Therapy short term goals initiated 05/09/21, to be achieved in 4-7 days. Pt will:   1. Perform bed mobility with indep in prep for OOB activity. 2. Perform sit <> stand transfers with RW and SBA in prep for functional mobility and ambulation. 3. Ambulate >15 ft with RW and SBA in prep for household and community mobility. 4. Ascend/descend 3 stairs with B HR and SBA for safe home entry. Note:     PHYSICAL THERAPY EVALUATION    Patient: Bienvenido Toure (14 y.o. female)  Date: 5/9/2021  Primary Diagnosis: Closed left hip fracture ([facility] [address] [S72.002A]  Procedure(s) (LRB):  OPEN REDUCTION INTERNAL FIXATION LEFT HIP WITH C-ARM (Left) 1 Day Post-Op   Precautions:  Fall, TTWB(L LE)  TTWB (LLE)  PLOF: Pt was independent with household and community mobility with no AD PTA. ASSESSMENT :  Based on the objective data described below, the patient presents with decr B UE and LE strength and ROM, decr activity tolerance, decr balance, and L hip pain resulting in deficits in overall functional mobility including bed mobility, transfers, and gait. Pt supine in bed on PT entry, rated L hip pain 4/10 at rest. Pt required Belle at L LE for bed mobility. In sitting pt with good sitting balance, did not require UE support. Pt educated on TTWB restriction of L LE and verbalized understanding. Pt transitioned to standing with RW and CGA/Belle from elevated bed. Pt tolerated standing for >30 seconds, then requesting to use bathroom. Pt returned to sitting, then set-up for [facility] transfer. PT performed stand pivot transfer, initially pivoting on R LE, then able to step with R LE while maintaining L LE TTWB. Pt unable to have BM, transferred back to bed. Pt educated on use of ice to decr pain and inflammation, home safety, and PT plan of care.  Pt left supine in bed with all needs met and all questions answered, ice to L hip. Recommend Rehab v HHPT pending progress. Patient will benefit from skilled intervention to address the above impairments. Patient's rehabilitation potential is considered to be Fair  Factors which may influence rehabilitation potential include:   []         None noted  []         Mental ability/status  []         Medical condition  []         Home/family situation and support systems  [x]         Safety awareness  [x]         Pain tolerance/management  []         Other:      PLAN :  Recommendations and Planned Interventions:   [x]           Bed Mobility Training             []    Neuromuscular Re-Education  [x]           Transfer Training                   []    Orthotic/Prosthetic Training  [x]           Gait Training                          [x]    Modalities  [x]           Therapeutic Exercises           []    Edema Management/Control  [x]           Therapeutic Activities            [x]    Family Training/Education  [x]           Patient Education  []           Other (comment):    Frequency/Duration: Patient will be followed by physical therapy 1-2 times per day/4-7 days per week to address goals. Discharge Recommendations: Rehab v HHPT  Further Equipment Recommendations for Discharge: rolling walker and BSC     SUBJECTIVE:   Patient stated I just can't believe I broke my leg.     OBJECTIVE DATA SUMMARY:     Past Medical History:   Diagnosis Date    Diabetes ([facility] [address]     Hypertension    History reviewed. No pertinent surgical history.   Barriers to Learning/Limitations: None  Compensate with: N/A  Home Situation:  Home Situation  Home Environment: Trailer/mobile home  # Steps to Enter: 3  Rails to Enter: Yes  Hand Rails : Bilateral  One/Two Story Residence: One story  Living Alone: Yes  Support Systems: Friends \ neighbors  Patient Expects to be Discharged to[de-identified] Private residence  Current DME Used/Available at Home: None  Tub or Shower Type: Tub/Shower combination  Critical Behavior:  Neurologic State: Alert  Orientation Level: Oriented X4  Cognition: Appropriate for age attention/concentration; Follows commands  Safety/Judgement: Decreased insight into deficits; Fall prevention;Home safety  Psychosocial  Patient Behaviors: Cooperative;Calm  Strength:    Strength: Generally decreased, functional  Tone & Sensation:   Tone: Normal  Sensation: Impaired(L hip)  Range Of Motion:  AROM: Generally decreased, functional  PROM: Generally decreased, functional  Functional Mobility:  Bed Mobility:  Rolling: Moderate assistance  Supine to Sit: Minimum assistance; Additional time  Sit to Supine: Minimum assistance; Additional time  Scooting: Stand-by assistance; Additional time  Transfers:  Sit to Stand: Contact guard assistance;Minimum assistance(vc, bed elevated)  Stand to Sit: Contact guard assistance;Minimum assistance(vc, bed elevated)  Stand Pivot Transfers: Minimum assistance; Additional time(vc)     Balance:   Sitting: Intact  Standing: Impaired; With support  Standing - Static: Fair  Standing - Dynamic : Fair  Pain:  Pain level pre-treatment: 4/10   Pain level post-treatment: 7/10   Pain Intervention(s) : Medication (see MAR); Rest, Ice, Repositioning  Response to intervention: Nurse notified, See doc flow  Activity Tolerance:   Pt tolerated transfer <> BSC with RW and Belle, able to maintain L LE TTWB, rated L hip pain 10/10 with mobility, decr with rest.   Please refer to the flowsheet for vital signs taken during this treatment. After treatment:   []         Patient left in no apparent distress sitting up in chair  [x]         Patient left in no apparent distress in bed  [x]         Call bell left within reach  [x]         Nursing notified  []         Caregiver present  []         Bed alarm activated  [x]         SCDs applied    COMMUNICATION/EDUCATION:   [x]         Role of Physical Therapy in the acute care setting.   [x]         Fall prevention education was provided and the patient/caregiver indicated understanding. [x]         Patient/family have participated as able in goal setting and plan of care. [x]         Patient/family agree to work toward stated goals and plan of care. []         Patient understands intent and goals of therapy, but is neutral about his/her participation. []         Patient is unable to participate in goal setting/plan of care: ongoing with therapy staff.  []         Other:     Thank you for this referral.  Dylan King   Time Calculation: 32 mins      Eval Complexity: History: MEDIUM  Complexity : 1-2 comorbidities / personal factors will impact the outcome/ POC Exam:MEDIUM Complexity : 3 Standardized tests and measures addressing body structure, function, activity limitation and / or participation in recreation  Presentation: MEDIUM Complexity : Evolving with changing characteristics  Clinical Decision Making:Medium Complexity    Overall Complexity:MEDIUM

## 2021-05-09 NOTE — PROGRESS NOTES
20:10 Assessment completed. O2 remains @ 2 LPM per NC. Lungs are clear but slightly decreased. Dsg on L hip remains C/D/I with CMS & PP intact. Resting quietly in bed with visitor @ bedside. 23:00 Shift assessment completed. See nsg flow sheet for details. 03:00 Reassessed with 0 changes noted. L hip dsg remains C/D/I. Resting quietly in bed with eyes closed between cares. 07:35 Bedside and Verbal shift change report given to Masha Alfonso RN (oncoming nurse) by To Day RN (offgoing nurse). Report included the following information SBAR.

## 2021-05-09 NOTE — PROGRESS NOTES
Hospitalist Progress Note-critical care note     Patient: Barney Wilkinson MRN: [id number]  CSN: [id number]    YOB: 1950  Age: 79 y.o. Sex: female    DOA: 5/8/2021 LOS:  LOS: 1 day            Chief complaint: Closed left hip fracture, diabetes, hypertension, closed fracture of rib    Assessment/Plan         Hospital Problems  Date Reviewed: 5/8/2021          Codes Class Noted POA    * (Principal) Closed left hip fracture ([address] ICD-10-CM: Z52.006U  ICD-9-CM: 820.8  5/8/2021 Unknown        Type 2 diabetes mellitus, without long-term current use of insulin (HCC) ICD-10-CM: E11.9  ICD-9-CM: 250.00  Unknown         Hypertension ICD-10-CM: I10  ICD-9-CM: 401.9  Unknown Unknown        Closed fracture of rib ICD-10-CM: S22.39XA  ICD-9-CM: 807.00  Unknown Unknown        Smoking ICD-10-CM: F17.200  ICD-9-CM: 305.1  5/8/2021 Unknown              Closed left hip fracture  Post day 1 OPEN REDUCTION INTERNAL FIXATION LEFT HIP WITH C-ARM per dr. Devi Cancer   Pain control,   Fall precaution, PT OT       Close to fracture of rib  Pain control, incentive spirometry as tolerated      Hypertension  Better controlled now, continue current regimen      Diabetes  Type II, hold p.o. medication, increase Lantus to 10 units   sliding scale     Smoker  Nicotine patch    Subjective: feel fine, pain is better now, will not go to rehab, coworker will help me at home     Will continue pt/ot, cm on board   Disposition :1-2 days   Review of systems:    General: No fevers or chills. Cardiovascular: No chest pain or pressure. No palpitations. Pulmonary: No shortness of breath. Gastrointestinal: No nausea, vomiting. Vital signs/Intake and Output:  Visit Vitals  BP (!) 114/47 (BP 1 Location: Right arm, BP Patient Position: At rest)   Pulse 99   Temp 97.7 °F (36.5 °C)   Resp 16   Wt 57.5 kg (126 lb 12.8 oz)   SpO2 99%     Current Shift:  No intake/output data recorded.   Last three shifts:  05/07 1901 - 05/09 0700  In: 1500 [I.V.:1500]  Out: 1850 [Urine:1800]    Physical Exam:  General: WD, WN. Alert, cooperative, no acute distress    HEENT: NC, Atraumatic. PERRLA, anicteric sclerae. Lungs: CTA Bilaterally. No Wheezing/Rhonchi/Rales. Heart:  Regular  rhythm,  No murmur, No Rubs, No Gallops  Abdomen: Soft, Non distended, Non tender. +Bowel sounds,   Extremities: No c/c/e  Psych:   Not anxious or agitated. Neurologic:  No acute neurological deficit. Labs: Results:       Chemistry Recent Labs     05/09/21  0335 05/08/21  1130   * 154*    141   K 3.9 4.1    108   CO2 29 24   BUN 7 7   CREA 0.51* 0.57*   CA 7.9* 8.4*   AGAP 6 9   BUCR 14 12      CBC w/Diff Recent Labs     05/09/21  0335 05/08/21  1130   WBC 11.2 8.7   RBC 3.20* 3.69*   HGB 10.7* 12.4   HCT 32.9* 38.0    212   GRANS 81* 74*   LYMPH 9* 16*   EOS 0 1      Cardiac Enzymes No results for input(s): CPK, CKND1, DIONICIO in the last 72 hours. No lab exists for component: CKRMB, TROIP   Coagulation No results for input(s): PTP, INR, APTT, INREXT in the last 72 hours. Lipid Panel No results found for: CHOL, CHOLPOCT, CHOLX, CHLST, CHOLV, 233518, HDL, HDLP, LDL, LDLC, DLDLP, 028982, VLDLC, VLDL, TGLX, TRIGL, TRIGP, TGLPOCT, CHHD, CHHDX   BNP No results for input(s): BNPP in the last 72 hours. Liver Enzymes No results for input(s): TP, ALB, TBIL, AP in the last 72 hours. No lab exists for component: SGOT, GPT, DBIL   Thyroid Studies No results found for: T4, T3U, TSH, TSHEXT     Procedures/imaging: see electronic medical records for all procedures/Xrays and details which were not copied into this note but were reviewed prior to creation of Plan    Xr Hip Lt W Or Wo Pelv 2-3 Vws    Result Date: 5/8/2021  EXAM: LEFT HIP RADIOGRAPHS CLINICAL INDICATION/HISTORY: fall -Additional: Fall with left hip injury and pain.  COMPARISON: None TECHNIQUE: AP pelvis and frog-leg lateral view of left hip. _______________ FINDINGS: BONES: Poorly visualized intertrochanteric fracture of the left hip. Subtle obliquely oriented lucency and foreshortening and mild angulation on the frontal view. Crosstable lateral view limited by body habitus. Bilateral DJD with superior acetabular osteophytosis. SOFT TISSUES: Vascular calcifications. _______________     Intertrochanteric fracture of left hip. Xr Chest Port    Result Date: 5/8/2021  EXAM: FRONTAL CHEST RADIOGRAPH CLINICAL INDICATION/HISTORY: Left hip fracture. Preoperative evaluation. COMPARISON: None TECHNIQUE: Frontal view of the chest _______________ FINDINGS: HEART AND MEDIASTINUM: Unremarkable. LUNGS AND PLEURAL SPACES: Unremarkable. BONY THORAX AND SOFT TISSUES: Minimally displaced fracture left posterolateral seventh rib. Possible adjacent eighth rib fracture without displacement. _______________     Left posterior rib fracture appears acute, without significant displacement. Possible eighth rib fracture as well. No pneumothorax.        Nicole Guevara MD

## 2021-05-09 NOTE — ROUTINE PROCESS
2010 
Bedside and Verbal shift change report given to Slime Llyod RN (oncoming nurse) by Richard Macias RN (offgoing nurse). Report included the following information SBAR, Kardex and MAR.

## 2021-05-10 LAB
ANION GAP SERPL CALC-SCNC: 5 MMOL/L (ref 3–18)
BASOPHILS # BLD: 0 K/UL (ref 0–0.1)
BASOPHILS NFR BLD: 0 % (ref 0–2)
BUN SERPL-MCNC: 7 MG/DL (ref 7–18)
BUN/CREAT SERPL: 17 (ref 12–20)
CALCIUM SERPL-MCNC: 8.8 MG/DL (ref 8.5–10.1)
CHLORIDE SERPL-SCNC: 103 MMOL/L (ref 100–111)
CO2 SERPL-SCNC: 32 MMOL/L (ref 21–32)
CREAT SERPL-MCNC: 0.42 MG/DL (ref 0.6–1.3)
DIFFERENTIAL METHOD BLD: ABNORMAL
EOSINOPHIL # BLD: 0 K/UL (ref 0–0.4)
EOSINOPHIL NFR BLD: 0 % (ref 0–5)
ERYTHROCYTE [DISTWIDTH] IN BLOOD BY AUTOMATED COUNT: 13.3 % (ref 11.6–14.5)
GLUCOSE BLD STRIP.AUTO-MCNC: 141 MG/DL (ref 70–110)
GLUCOSE BLD STRIP.AUTO-MCNC: 153 MG/DL (ref 70–110)
GLUCOSE BLD STRIP.AUTO-MCNC: 219 MG/DL (ref 70–110)
GLUCOSE BLD STRIP.AUTO-MCNC: 78 MG/DL (ref 70–110)
GLUCOSE BLD STRIP.AUTO-MCNC: 83 MG/DL (ref 70–110)
GLUCOSE BLD STRIP.AUTO-MCNC: 98 MG/DL (ref 70–110)
GLUCOSE SERPL-MCNC: 75 MG/DL (ref 74–99)
HCT VFR BLD AUTO: 31.5 % (ref 35–45)
HGB BLD-MCNC: 10.3 G/DL (ref 12–16)
LYMPHOCYTES # BLD: 1.6 K/UL (ref 0.9–3.6)
LYMPHOCYTES NFR BLD: 15 % (ref 21–52)
MAGNESIUM SERPL-MCNC: 1.7 MG/DL (ref 1.6–2.6)
MCH RBC QN AUTO: 34.1 PG (ref 24–34)
MCHC RBC AUTO-ENTMCNC: 32.7 G/DL (ref 31–37)
MCV RBC AUTO: 104.3 FL (ref 74–97)
MONOCYTES # BLD: 1.5 K/UL (ref 0.05–1.2)
MONOCYTES NFR BLD: 14 % (ref 3–10)
NEUTS SEG # BLD: 7.5 K/UL (ref 1.8–8)
NEUTS SEG NFR BLD: 69 % (ref 40–73)
PLATELET # BLD AUTO: 196 K/UL (ref 135–420)
PMV BLD AUTO: 9.5 FL (ref 9.2–11.8)
POTASSIUM SERPL-SCNC: 4.6 MMOL/L (ref 3.5–5.5)
RBC # BLD AUTO: 3.02 M/UL (ref 4.2–5.3)
SODIUM SERPL-SCNC: 140 MMOL/L (ref 136–145)
WBC # BLD AUTO: 10.8 K/UL (ref 4.6–13.2)

## 2021-05-10 PROCEDURE — 74011250636 HC RX REV CODE- 250/636: Performed by: HOSPITALIST

## 2021-05-10 PROCEDURE — 74011636637 HC RX REV CODE- 636/637: Performed by: HOSPITALIST

## 2021-05-10 PROCEDURE — 80048 BASIC METABOLIC PNL TOTAL CA: CPT

## 2021-05-10 PROCEDURE — 65270000029 HC RM PRIVATE

## 2021-05-10 PROCEDURE — 97116 GAIT TRAINING THERAPY: CPT

## 2021-05-10 PROCEDURE — 85025 COMPLETE CBC W/AUTO DIFF WBC: CPT

## 2021-05-10 PROCEDURE — 36415 COLL VENOUS BLD VENIPUNCTURE: CPT

## 2021-05-10 PROCEDURE — 82962 GLUCOSE BLOOD TEST: CPT

## 2021-05-10 PROCEDURE — 74011250637 HC RX REV CODE- 250/637: Performed by: ORTHOPAEDIC SURGERY

## 2021-05-10 PROCEDURE — 97530 THERAPEUTIC ACTIVITIES: CPT

## 2021-05-10 PROCEDURE — 83735 ASSAY OF MAGNESIUM: CPT

## 2021-05-10 PROCEDURE — 74011250636 HC RX REV CODE- 250/636: Performed by: ORTHOPAEDIC SURGERY

## 2021-05-10 PROCEDURE — 74011250637 HC RX REV CODE- 250/637: Performed by: HOSPITALIST

## 2021-05-10 RX ORDER — NALOXONE HYDROCHLORIDE 4 MG/.1ML
SPRAY NASAL
Qty: 1 EACH | Refills: 0 | Status: SHIPPED | OUTPATIENT
Start: 2021-05-10

## 2021-05-10 RX ORDER — MAGNESIUM SULFATE HEPTAHYDRATE 40 MG/ML
2 INJECTION, SOLUTION INTRAVENOUS ONCE
Status: COMPLETED | OUTPATIENT
Start: 2021-05-10 | End: 2021-05-10

## 2021-05-10 RX ORDER — MAGNESIUM SULFATE 1 G/100ML
1 INJECTION INTRAVENOUS ONCE
Status: COMPLETED | OUTPATIENT
Start: 2021-05-10 | End: 2021-05-10

## 2021-05-10 RX ORDER — ASPIRIN 325 MG
TABLET ORAL
Qty: 120 TAB | Refills: 1 | Status: SHIPPED | OUTPATIENT
Start: 2021-05-10

## 2021-05-10 RX ORDER — METOPROLOL TARTRATE 25 MG/1
12.5 TABLET, FILM COATED ORAL EVERY 12 HOURS
Status: DISCONTINUED | OUTPATIENT
Start: 2021-05-10 | End: 2021-05-12 | Stop reason: HOSPADM

## 2021-05-10 RX ORDER — OXYCODONE AND ACETAMINOPHEN 5; 325 MG/1; MG/1
1-2 TABLET ORAL
Qty: 84 TAB | Refills: 0 | Status: SHIPPED | OUTPATIENT
Start: 2021-05-10 | End: 2021-05-20

## 2021-05-10 RX ADMIN — OXYCODONE HYDROCHLORIDE AND ACETAMINOPHEN 2 TABLET: 5; 325 TABLET ORAL at 22:43

## 2021-05-10 RX ADMIN — Medication 10 ML: at 06:19

## 2021-05-10 RX ADMIN — INSULIN GLARGINE 10 UNITS: 100 INJECTION, SOLUTION SUBCUTANEOUS at 21:22

## 2021-05-10 RX ADMIN — SENNOSIDES 8.6 MG: 8.6 TABLET, FILM COATED ORAL at 21:22

## 2021-05-10 RX ADMIN — OXYCODONE HYDROCHLORIDE AND ACETAMINOPHEN 2 TABLET: 5; 325 TABLET ORAL at 08:57

## 2021-05-10 RX ADMIN — Medication 10 ML: at 00:15

## 2021-05-10 RX ADMIN — METOPROLOL TARTRATE 12.5 MG: 25 TABLET, FILM COATED ORAL at 21:53

## 2021-05-10 RX ADMIN — LISINOPRIL 10 MG: 5 TABLET ORAL at 08:56

## 2021-05-10 RX ADMIN — OXYCODONE HYDROCHLORIDE AND ACETAMINOPHEN 2 TABLET: 5; 325 TABLET ORAL at 00:47

## 2021-05-10 RX ADMIN — INSULIN GLARGINE 10 UNITS: 100 INJECTION, SOLUTION SUBCUTANEOUS at 00:07

## 2021-05-10 RX ADMIN — MAGNESIUM SULFATE HEPTAHYDRATE 1 G: 1 INJECTION, SOLUTION INTRAVENOUS at 15:03

## 2021-05-10 RX ADMIN — SENNOSIDES 8.6 MG: 8.6 TABLET, FILM COATED ORAL at 08:57

## 2021-05-10 RX ADMIN — OXYCODONE HYDROCHLORIDE AND ACETAMINOPHEN 2 TABLET: 5; 325 TABLET ORAL at 18:49

## 2021-05-10 RX ADMIN — OXYCODONE HYDROCHLORIDE AND ACETAMINOPHEN 2 TABLET: 5; 325 TABLET ORAL at 14:08

## 2021-05-10 RX ADMIN — Medication 10 ML: at 21:22

## 2021-05-10 RX ADMIN — MAGNESIUM SULFATE HEPTAHYDRATE 2 G: 40 INJECTION, SOLUTION INTRAVENOUS at 16:00

## 2021-05-10 RX ADMIN — INSULIN LISPRO 4 UNITS: 100 INJECTION, SOLUTION INTRAVENOUS; SUBCUTANEOUS at 23:08

## 2021-05-10 NOTE — DIABETES MGMT
GLYCEMIC CONTROL PLAN OF CARE     Assessment:  Pt admitted with closed hip fracture s/p ORIF, closed fracture rib. Past Medical Hx includes T2DM, smoking. A1C reveals well controlled DM PTA. Pt reports her PCP recently decreased her glipizide from 10 mg BID to 10 mg once daily (and she is to continue taking her metformin 1000 mg BID). Recommendations:  Monitor glycemic control. Most recent blood glucose values:  5/10:  141, 78, 98, 83  5/09:  266, 184, 233, 106      Current A1C of 6.1 % is equivalent to average blood glucose of 128 mg/dl over the past 2-3 months.     Current hospital diabetes medications:   10 units Lantus/d  Corrective lispro, normal insulin sensitivity ACHS    Previous day's insulin requirements:  17 units (5 units Lantus and 12 units corrective lispro)  Home diabetes medications:  Glipizide 10 mg once/day (will modify in PTA meds list)  Metformin 1000 mg BID    Diet:  DIET DIABETIC CONSISTENT CARB   Meal Intake:   Patient Vitals for the past 168 hrs:   % Diet Eaten   05/10/21 0945 76 - 100%       Education:  _x___Refer to Diabetes Education Record  5/10/21            ____Education not indicated at this time      Amilcar Gutierrez, [address] [place] [address], [address] [place] [address]  Diabetes and Glycemic Control   Office:  [phone]  Pager:  [phone]

## 2021-05-10 NOTE — ROUTINE PROCESS
Bedside and Verbal shift change report given to HAYLEY Bowers RN (oncoming nurse) by Jorge Bland RN (offgoing nurse). Report included the following information SBAR, Kardex, Intake/Output, MAR and Recent Results. Introduced self to pt, updated whiteboard. No c/o pain/discomfort at this time. NAD noted. Call bell within reach. Shift summary: PRN pain meds given x2. Dressing CDI. Attempted BM by bedpan but unable to. VSS. Uneventful shift. Bedside and Verbal shift change report given to VARUN Martinez RN (oncoming nurse) by Ronen Castro. Carlos Bowers RN (offgoing nurse). Report included the following information SBAR, Kardex, Procedure Summary, Intake/Output and Recent Results.

## 2021-05-10 NOTE — PROGRESS NOTES
Problem: Falls - Risk of  Goal: *Absence of Falls  Description: Document Nain Villalta Fall Risk and appropriate interventions in the flowsheet.   Outcome: Progressing Towards Goal  Note: Fall Risk Interventions:  Mobility Interventions: Patient to call before getting OOB, PT Consult for mobility concerns, Utilize walker, cane, or other assistive device    Mentation Interventions: Adequate sleep, hydration, pain control    Medication Interventions: Patient to call before getting OOB, Teach patient to arise slowly    Elimination Interventions: Call light in reach, Patient to call for help with toileting needs    History of Falls Interventions: Door open when patient unattended, Evaluate medications/consider consulting pharmacy, Room close to nurse's station         Problem: Patient Education: Go to Patient Education Activity  Goal: Patient/Family Education  Outcome: Progressing Towards Goal     Problem: Hip Replacement: Day of Surgery/Unit  Goal: Activity/Safety  Outcome: Progressing Towards Goal  Goal: Consults, if ordered  Outcome: Progressing Towards Goal  Goal: Diagnostic Test/Procedures  Outcome: Progressing Towards Goal  Goal: Nutrition/Diet  Outcome: Progressing Towards Goal  Goal: Medications  Outcome: Progressing Towards Goal  Goal: Respiratory  Outcome: Progressing Towards Goal  Goal: Treatments/Interventions/Procedures  Outcome: Progressing Towards Goal  Goal: Psychosocial  Outcome: Progressing Towards Goal  Goal: *Initiate mobility  Outcome: Progressing Towards Goal  Goal: *Optimal pain control at patient's stated goal  Outcome: Progressing Towards Goal  Goal: *Hemodynamically stable  Outcome: Progressing Towards Goal     Problem: Patient Education: Go to Patient Education Activity  Goal: Patient/Family Education  Outcome: Progressing Towards Goal

## 2021-05-10 NOTE — PROGRESS NOTES
D/C Plan: HH, RW and physician follow up (workman's comp)    CM met with pt at bedside to discuss transition of care. CM discussed HH as an option. Pt has declined HH. Pt indicated she has family/friends that will be with her 24/7. Pt's significant other, Stacie Wei ([phone]), also lives with pt and will assist.  Noted pt would benefit from a RW. Pt is agreeable to a RW. Pt has indicated this is workman's comp. Pt provided CM with the number to her employer/supervisor (Sky Castillo [phone]), to obtain workman's comp CM contact information to assist with DME (RW). CM attempted to contact pt's supervisor/employer and left a message requesting a return call. CM to continue to follow and assist.    1400:  CM spoke with pt's employer/supervisor, Sky Castillo ([phone]), to discuss transition of care/workman's comp. Pt's supervisor has indicated [facility] or [facility] will not pay for the care from the injury. Ms. Tonie Arita will provide CM with the contact information for the workman's comp CM once available. Please note the claim was filed today as incident occurred on Saturday. CM to await contact information for workman's comp CM and claim number to assist with facilitating transition of care services. CM to continue to follow and assist.    Care Management Interventions  PCP Verified by CM: Yes  Mode of Transport at Discharge: Other (see comment)(friends/coworkers)  Transition of Care Consult (CM Consult): Discharge Planning  Health Maintenance Reviewed: Yes  Physical Therapy Consult: Yes  Occupational Therapy Consult: Yes  Current Support Network:  Other(lives with significant other)  Confirm Follow Up Transport: Friends  The Plan for Transition of Care is Related to the Following Treatment Goals :  HH, RW and physician follow up (workman's comp)  The Patient and/or Patient Representative was Provided with a Choice of Provider and Agrees with the Discharge Plan?: Yes  Name of the Patient Representative Who was Provided with a Choice of Provider and Agrees with the Discharge Plan: [employer]  Freedom of Choice List was Provided with Basic Dialogue that Supports the Patient's Individualized Plan of Care/Goals, Treatment Preferences and Shares the Quality Data Associated with the Providers?: Yes  Discharge Location  Discharge Placement: Home with home health

## 2021-05-10 NOTE — PROGRESS NOTES
Progress Note POD #2      Patient: Beto Reynolds               Sex: female          DOA: 5/8/2021         YOB: 1950      Surgery: Procedure(s):  OPEN REDUCTION INTERNAL FIXATION LEFT HIP WITH C-ARM           LOS: 2 days               Subjective:     No new complaints. Wants to go home. Has help at home from friends. Objective:      Visit Vitals  BP (!) 142/62   Pulse (!) 111   Temp 98 °F (36.7 °C)   Resp 16   Wt 57.4 kg (126 lb 9.6 oz)   SpO2 100%       Physical Exam:  Neurological: Dressing C/D/I.                            sensation: intact to light touch. No calf pain to palpation. Patient mobility  Bed Mobility Training  Rolling: Moderate assistance  Supine to Sit: Minimum assistance, Additional time  Sit to Supine: Minimum assistance, Additional time  Scooting: Stand-by assistance, Additional time  Transfer Training  Sit to Stand: Contact guard assistance, Minimum assistance(vc, bed elevated)  Stand to Sit: Contact guard assistance, Minimum assistance(vc, bed elevated)                   Intake and Output:  Current Shift:  No intake/output data recorded. Last three shifts:  05/08 1901 - 05/10 0700  In: 100 [I.V.:100]  Out: 1175 [Urine:1175]    Lab/Data Reviewed:  Lab Results   Component Value Date/Time    WBC 10.8 05/10/2021 05:37 AM    HGB 10.3 (L) 05/10/2021 05:37 AM    HCT 31.5 (L) 05/10/2021 05:37 AM    PLATELET 200 05/08/9482 05:37 AM    .3 (H) 05/10/2021 05:37 AM     No results found for: APTT  No results found for: INR, PTMR, PTP, PT1, PT2, INREXT       Assessment/Plan     Principal Problem:    Closed left hip fracture ([address] (5/8/2021)    Active Problems:    Type 2 diabetes mellitus, without long-term current use of insulin (HCC) ()      Hypertension ()      Closed fracture of rib ()      Smoking (5/8/2021)        1. Stable  2. OOB with PT- TDWB  3. [facility]. 4. Change dressing prior to discharge home.   5.Discharge to home after being cleared by P.T  6. Follow-up in 10 days at [facility] with Dr. Marily Riedel.

## 2021-05-10 NOTE — PROGRESS NOTES
0756  Bedside and Verbal shift change report given to VARUN Pendleton (oncoming nurse) by Montana Hankins. Sarah Hernandez RN (offgoing nurse). Report included the following information SBAR, Kardex, OR Summary, Intake/Output and MAR.    0845  Shift assessment completed. Pt AOX4 RA. Lung sounds clear, bowel sounds active. Pt has incision to the left hip that is reinforced with ABD pad and gauze dressing. Pt has scattered bruising but skin is otherwise intact. Pt has odonnell catheter in place that is patent and draining. Pt educated on the indications for the application of SCDs and incentive spirometer use. Pt verbalizes and demonstrates understanding. 36  Pt working with physical therapy. Pt tolerating well.     1700  Odonnell catheter discontinued with 1500 ml of clear-yellow urine. Pt tolerated procedure well. 1830  Pt received PRN pain medication x3. Pt also utilizing ice application to manage pain. 1955  Bedside and Verbal shift change report given by VARUN Gray RN (off going nurse) to NATALIE Varghese RN (on coming nurse). Report included the following information SBAR, Kardex, OR Summary, Intake/Output and MAR.

## 2021-05-11 LAB
ANION GAP SERPL CALC-SCNC: 7 MMOL/L (ref 3–18)
BASOPHILS # BLD: 0 K/UL (ref 0–0.1)
BASOPHILS NFR BLD: 0 % (ref 0–2)
BUN SERPL-MCNC: 7 MG/DL (ref 7–18)
BUN/CREAT SERPL: 14 (ref 12–20)
CALCIUM SERPL-MCNC: 8.3 MG/DL (ref 8.5–10.1)
CHLORIDE SERPL-SCNC: 100 MMOL/L (ref 100–111)
CO2 SERPL-SCNC: 31 MMOL/L (ref 21–32)
CREAT SERPL-MCNC: 0.51 MG/DL (ref 0.6–1.3)
DIFFERENTIAL METHOD BLD: ABNORMAL
EOSINOPHIL # BLD: 0.1 K/UL (ref 0–0.4)
EOSINOPHIL NFR BLD: 1 % (ref 0–5)
ERYTHROCYTE [DISTWIDTH] IN BLOOD BY AUTOMATED COUNT: 13.1 % (ref 11.6–14.5)
GLUCOSE BLD STRIP.AUTO-MCNC: 114 MG/DL (ref 70–110)
GLUCOSE BLD STRIP.AUTO-MCNC: 128 MG/DL (ref 70–110)
GLUCOSE SERPL-MCNC: 104 MG/DL (ref 74–99)
HCT VFR BLD AUTO: 29.9 % (ref 35–45)
HGB BLD-MCNC: 9.4 G/DL (ref 12–16)
LYMPHOCYTES # BLD: 1.5 K/UL (ref 0.9–3.6)
LYMPHOCYTES NFR BLD: 16 % (ref 21–52)
MCH RBC QN AUTO: 32.9 PG (ref 24–34)
MCHC RBC AUTO-ENTMCNC: 31.4 G/DL (ref 31–37)
MCV RBC AUTO: 104.5 FL (ref 74–97)
MONOCYTES # BLD: 1.3 K/UL (ref 0.05–1.2)
MONOCYTES NFR BLD: 14 % (ref 3–10)
NEUTS SEG # BLD: 6.5 K/UL (ref 1.8–8)
NEUTS SEG NFR BLD: 68 % (ref 40–73)
PLATELET # BLD AUTO: 212 K/UL (ref 135–420)
PMV BLD AUTO: 9.7 FL (ref 9.2–11.8)
POTASSIUM SERPL-SCNC: 4.1 MMOL/L (ref 3.5–5.5)
RBC # BLD AUTO: 2.86 M/UL (ref 4.2–5.3)
SARS-COV-2, COV2NT: NOT DETECTED
SODIUM SERPL-SCNC: 138 MMOL/L (ref 136–145)
WBC # BLD AUTO: 9.5 K/UL (ref 4.6–13.2)

## 2021-05-11 PROCEDURE — 97535 SELF CARE MNGMENT TRAINING: CPT

## 2021-05-11 PROCEDURE — 74011636637 HC RX REV CODE- 636/637: Performed by: HOSPITALIST

## 2021-05-11 PROCEDURE — 85025 COMPLETE CBC W/AUTO DIFF WBC: CPT

## 2021-05-11 PROCEDURE — 36415 COLL VENOUS BLD VENIPUNCTURE: CPT

## 2021-05-11 PROCEDURE — 74011250637 HC RX REV CODE- 250/637: Performed by: HOSPITALIST

## 2021-05-11 PROCEDURE — 80048 BASIC METABOLIC PNL TOTAL CA: CPT

## 2021-05-11 PROCEDURE — 97116 GAIT TRAINING THERAPY: CPT

## 2021-05-11 PROCEDURE — 82962 GLUCOSE BLOOD TEST: CPT

## 2021-05-11 PROCEDURE — 74011250637 HC RX REV CODE- 250/637: Performed by: ORTHOPAEDIC SURGERY

## 2021-05-11 PROCEDURE — 97166 OT EVAL MOD COMPLEX 45 MIN: CPT

## 2021-05-11 PROCEDURE — 65270000029 HC RM PRIVATE

## 2021-05-11 RX ADMIN — SENNOSIDES 8.6 MG: 8.6 TABLET, FILM COATED ORAL at 21:17

## 2021-05-11 RX ADMIN — OXYCODONE HYDROCHLORIDE AND ACETAMINOPHEN 2 TABLET: 5; 325 TABLET ORAL at 09:41

## 2021-05-11 RX ADMIN — SENNOSIDES 8.6 MG: 8.6 TABLET, FILM COATED ORAL at 09:35

## 2021-05-11 RX ADMIN — Medication 10 ML: at 06:19

## 2021-05-11 RX ADMIN — OXYCODONE HYDROCHLORIDE AND ACETAMINOPHEN 2 TABLET: 5; 325 TABLET ORAL at 20:50

## 2021-05-11 RX ADMIN — OXYCODONE HYDROCHLORIDE AND ACETAMINOPHEN 2 TABLET: 5; 325 TABLET ORAL at 03:02

## 2021-05-11 RX ADMIN — METOPROLOL TARTRATE 12.5 MG: 25 TABLET, FILM COATED ORAL at 09:33

## 2021-05-11 RX ADMIN — INSULIN GLARGINE 10 UNITS: 100 INJECTION, SOLUTION SUBCUTANEOUS at 21:17

## 2021-05-11 RX ADMIN — Medication 10 ML: at 21:17

## 2021-05-11 RX ADMIN — LISINOPRIL 10 MG: 5 TABLET ORAL at 09:33

## 2021-05-11 RX ADMIN — OXYCODONE HYDROCHLORIDE AND ACETAMINOPHEN 2 TABLET: 5; 325 TABLET ORAL at 15:44

## 2021-05-11 RX ADMIN — METOPROLOL TARTRATE 12.5 MG: 25 TABLET, FILM COATED ORAL at 21:17

## 2021-05-11 NOTE — PROGRESS NOTES
D/C Plan: HH, RW and physician follow up (to be arranged by [employer])    CM received a call from pt's employer/supervisor, Reema Sena,  and have been provided with the workman's comp information below:    [employer]   [phone]  Claim # SABRINA [address] [id number]  CM direct #:  Ebenezer Wilder ([phone]). CM attempted to contact pt's assigned CM with [employer] ([phone]), and left a vm requesting a return call. Anticipate pt will transition home with [facility] and a RW once arranged by [employer]. CM to continue to follow and assist.      1210:  CM attempted to contact pt's assigned CM with [employer] ([phone]), and left a vm requesting a return call. 1330:  CM attempted to contact pt's assigned CM with [employer] ([phone]), and left a vm requesting a return call.        1400:  CM has been contacted by [employer] Elizabeth Shin), and have been notified pt now has a different/new CM, Ebenezer Wilder ([phone]), for [employer]. Antony Reynaga has requested clinical information/orders be faxed to   [phone] to assist with transition of care. [employer] has been notified to assist.  CM to await confirmation of services to move forward with transition of care. Anticipate pt will transition home with [facility] and a RW with in the next 24 hours. CM to continue to follow and assist.      1500:  CM attempted to contact pt's newly assigned CM with [employer],  Ebenezer Wilder ([phone]), and left a message requesting a return call. CM then contacted Elizabeth Shin ([phone]), to request an update. Mr. Jodie Bernard has indicated services are being arranged through [address] [place] [address]. Vijay Brush ([phone]) is assisting with DME and Kathy Friedman ([phone]) is assisting with [facility] services. CM attempted to contact  [address] [place] [address] ([phone]) and left a vm requesting a return call.   CM attempted to contact Kathy Friedman and left a vm requesting an update regarding the [facility] agency and confirm arrangements. CM to continue to follow. 1525:  CM received a call from Swapna Tate ([phone]), and she has indicated she has received the referral and will contact CM with in the next hour and notify CM if this can be arranged today or if it will be tomorrow. CM to continue to follow and assist.    1620:  CM has been contacted by Swapna Tate, with [employer] and have been notified services are not confirmed at this time and they will follow up tomorrow to continue to work on DME and [facility] for this pt. Provider has been made aware. Anticipate pt will transition home tomorrow with [facility], RW and physician follow up, all to be facilitated through workman's comp. CM to continue to follow and assist.    0135:  CM spoke with Sue Jack ([phone]), who is assisting with the RW, and she has indicated the RW will be delivered to the pt's room tomorrow. CM to continue to follow and assist.  Anticipate pt will transition home tomorrow with [facility] and RW with physician follow up, all to be arranged by [employer]. Care Management Interventions  PCP Verified by CM: Yes  Mode of Transport at Discharge: Other (see comment)(friends/coworkers)  Transition of Care Consult (CM Consult): Discharge Planning  Health Maintenance Reviewed: Yes  Physical Therapy Consult: Yes  Occupational Therapy Consult: Yes  Current Support Network:  Other(lives with significant other)  Confirm Follow Up Transport: Friends  The Plan for Transition of Care is Related to the Following Treatment Goals :  HH, RW and physician follow up ([employer])  The Patient and/or Patient Representative was Provided with a Choice of Provider and Agrees with the Discharge Plan?: Yes  Name of the Patient Representative Who was Provided with a Choice of Provider and Agrees with the Discharge Plan: [employer]  Freedom of Choice List was Provided with Basic Dialogue that Supports the Patient's Individualized Plan of Care/Goals, Treatment Preferences and Shares the Quality Data Associated with the Providers?: Yes  Discharge Location  Discharge Placement: Home with home health

## 2021-05-11 NOTE — PROGRESS NOTES
Bedside and Verbal shift change report given to [address] (oncoming nurse) by CHRISTOPHER Fairbanks (offgoing nurse). Report included the following information SBAR, Kardex, Intake/Output, MAR and Recent Results. Shift Summary-   Patient Vitals for the past 12 hrs:   Temp Pulse Resp BP SpO2   05/12/21 0356 98 °F (36.7 °C) (!) 113 16 (!) 183/78 100 %   05/11/21 2304 98.2 °F (36.8 °C) (!) 103 16 (!) 158/53 95 %   05/11/21 1854 98 °F (36.7 °C) (!) 105 18 (!) 152/64 100 %   Pt given PRN percocet x2 overnight for pain rated 7/10 to L hip    Bedside and Verbal shift change report given to CHRISTOPHER Fairbanks (oncoming nurse) by [address] (offgoing nurse). Report included the following information SBAR, Kardex, Intake/Output, MAR and Recent Results.

## 2021-05-11 NOTE — PROGRESS NOTES
Problem: Mobility Impaired (Adult and Pediatric)  Goal: *Acute Goals and Plan of Care (Insert Text)  Description: Physical Therapy short term goals initiated 05/09/21, to be achieved in 4-7 days. Pt will:   1. Perform bed mobility with indep in prep for OOB activity. 2. Perform sit <> stand transfers with RW and SBA in prep for functional mobility and ambulation. 3. Ambulate >15 ft with RW and SBA in prep for household and community mobility. 4. Ascend/descend 3 stairs with B HR and SBA for safe home entry. Outcome: Resolved/Met   [x]  Patient has met MD sumanth rodgers for d/c home   []  Recommend HH with 24 hour adult care   []  Benefit from additional acute PT session to address:      PHYSICAL THERAPY TREATMENT    Patient: Andrea Prather (70 y.o. female)  Date: 5/11/2021  Diagnosis: Closed left hip fracture (HCC) [S72.002A] Closed left hip fracture (HCC)  Procedure(s) (LRB):  OPEN REDUCTION INTERNAL FIXATION LEFT HIP WITH C-ARM (Left) 3 Days Post-Op  Precautions: Fall, TTWB(L LE)  PLOF: independent, ambulatory without AD, needs a RW    ASSESSMENT:  Pt utilized self assist hook tech to assist LLE in/out of bed. Elevated bed to height of bed at home, no difficulty performing sit to stand. Ambulated 40ft with RW, gentle hopping on the RLE, able to maintain TTWB or NWB throughout session. Negotiated a 6\" box step with RW 3x without difficulty. Returned to supine in bed with self assist hook tech. Progression toward goals:   []      Improving appropriately and progressing toward goals  [x]      Improving slowly and progressing toward goals  []      Not making progress toward goals and plan of care will be adjusted     PLAN:  Patient continues to benefit from skilled intervention to address the above impairments. Continue treatment per established plan of care.   Discharge Recommendations:  Home Health  Further Equipment Recommendations for Discharge:  rolling walker     SUBJECTIVE:   Patient stated I just got up.     OBJECTIVE DATA SUMMARY:   Critical Behavior:  Neurologic State: Alert  Orientation Level: Oriented X4  Cognition: Follows commands  Safety/Judgement: Decreased insight into deficits, Fall prevention, Home safety  Functional Mobility Training:  Bed Mobility:    Supine to Sit: Modified independent  Sit to Supine: Modified independent  Scooting: Supervision  Transfers:  Sit to Stand: Supervision  Stand to Sit: Supervision  Balance:  Sitting: Intact  Standing: Intact; With support  Standing - Static: Good  Standing - Dynamic : Good   Ambulation/Gait Training:  Distance (ft): 40 Feet (ft)  Assistive Device: Gait belt;Walker, rolling  Ambulation - Level of Assistance: Stand-by assistance  Gait Abnormalities: Decreased step clearance    Left Side Weight Bearing: Toe touch  Speed/Rosemary: Slow  Stairs:  Number of Stairs Trained: 3(6\" box step)  Stairs - Level of Assistance: Stand-by assistance;Contact guard assistance  Rail Use: (RW)        Pain:  Pain level pre-treatment: 0/10  Pain level post-treatment: 0/10   Pain Intervention(s): Medication (see MAR); Rest, Ice, Repositioning   Response to intervention: Nurse notified, See doc flow    Activity Tolerance:   Good  Please refer to the flowsheet for vital signs taken during this treatment. After treatment:   [] Patient left in no apparent distress sitting up in chair  [x] Patient left in no apparent distress in bed  [x] Call bell left within reach  [] Nursing notified  [] Caregiver present  [] Bed alarm activated  [x] SCDs applied      COMMUNICATION/EDUCATION:   [x]         Role of Physical Therapy in the acute care setting. [x]         Fall prevention education was provided and the patient/caregiver indicated understanding. [x]         Patient/family have participated as able in working toward goals and plan of care. [x]         Patient/family agree to work toward stated goals and plan of care.   []         Patient understands intent and goals of therapy, but is neutral about his/her participation.   []         Patient is unable to participate in stated goals/plan of care: ongoing with therapy staff.  []         Other:        Shima Merrill, SHANNON   Time Calculation: 15 mins

## 2021-05-11 NOTE — PROGRESS NOTES
Hospitalist Progress Note-critical care note     Patient: Hector Frazier MRN: [id number]  CSN: [id number]    YOB: 1950  Age: 79 y.o. Sex: female    DOA: 5/8/2021 LOS:  LOS: 3 days            Chief complaint: Closed left hip fracture, diabetes, hypertension, closed fracture of rib    Assessment/Plan         Hospital Problems  Date Reviewed: 5/8/2021          Codes Class Noted POA    * (Principal) Closed left hip fracture ([facility] ICD-10-CM: W10.034I  ICD-9-CM: 820.8  5/8/2021 Unknown        Type 2 diabetes mellitus, without long-term current use of insulin ([facility] ICD-10-CM: E11.9  ICD-9-CM: 250.00  Unknown         Hypertension ICD-10-CM: I10  ICD-9-CM: 401.9  Unknown Unknown        Closed fracture of rib ICD-10-CM: S22.39XA  ICD-9-CM: 807.00  Unknown Unknown        Smoking ICD-10-CM: F17.200  ICD-9-CM: 305.1  5/8/2021 Unknown              Closed left hip fracture  Post day 3 OPEN REDUCTION INTERNAL FIXATION LEFT HIP WITH C-ARM per dr. Alberto Estes   Pain control,   Fall precaution, PT OT     Close to fracture of rib  Pain control, incentive spirometry as tolerated      Hypertension   controlled better add low dose metoprolol      Diabetes  controlled Lantus to 10 units   sliding scale     Smoker  Nicotine patch  ,     Subjective: pain is better, can not wait to go home to see my cats     CM is working for home h.h   Disposition :tomorrow   Review of systems:    General: No fevers or chills. Cardiovascular: No chest pain or pressure. No palpitations. Pulmonary: No shortness of breath. Gastrointestinal: No nausea, vomiting. Vital signs/Intake and Output:  Visit Vitals  BP (!) 158/59   Pulse (!) 106   Temp 97.7 °F (36.5 °C)   Resp 18   Wt 57.4 kg (126 lb 9.6 oz)   SpO2 98%     Current Shift:  No intake/output data recorded. Last three shifts:  05/09 1901 - 05/11 0700  In: 5 [P.O.:420]  Out: 3625 [Urine:3625]    Physical Exam:  General: WD, WN.   Alert, cooperative, no acute distress    HEENT: NC, Atraumatic. PERRLA, anicteric sclerae. Lungs: CTA Bilaterally. No Wheezing/Rhonchi/Rales. Heart:  Regular  rhythm,  No murmur, No Rubs, No Gallops  Abdomen: Soft, Non distended, Non tender. +Bowel sounds,   Extremities: No c/c/. Left hip coved with dressing   Psych:   Not anxious or agitated. Neurologic:  No acute neurological deficit. Labs: Results:       Chemistry Recent Labs     05/11/21  0338 05/10/21  0537 05/09/21  0335   * 75 191*    140 138   K 4.1 4.6 3.9    103 103   CO2 31 32 29   BUN 7 7 7   CREA 0.51* 0.42* 0.51*   CA 8.3* 8.8 7.9*   AGAP 7 5 6   BUCR 14 17 14      CBC w/Diff Recent Labs     05/11/21  0338 05/10/21  0537 05/09/21  0335   WBC 9.5 10.8 11.2   RBC 2.86* 3.02* 3.20*   HGB 9.4* 10.3* 10.7*   HCT 29.9* 31.5* 32.9*    196 207   GRANS 68 69 81*   LYMPH 16* 15* 9*   EOS 1 0 0      Cardiac Enzymes No results for input(s): CPK, CKND1, DIONICIO in the last 72 hours. No lab exists for component: CKRMB, TROIP   Coagulation No results for input(s): PTP, INR, APTT, INREXT, INREXT in the last 72 hours. Lipid Panel No results found for: CHOL, CHOLPOCT, CHOLX, CHLST, CHOLV, 752864, HDL, HDLP, LDL, LDLC, DLDLP, 516936, VLDLC, VLDL, TGLX, TRIGL, TRIGP, TGLPOCT, CHHD, CHHDX   BNP No results for input(s): BNPP in the last 72 hours. Liver Enzymes No results for input(s): TP, ALB, TBIL, AP in the last 72 hours. No lab exists for component: SGOT, GPT, DBIL   Thyroid Studies No results found for: T4, T3U, TSH, TSHEXT, TSHEXT     Procedures/imaging: see electronic medical records for all procedures/Xrays and details which were not copied into this note but were reviewed prior to creation of Plan    Xr Hip Lt W Or Wo Pelv 2-3 Vws    Result Date: 5/8/2021  EXAM: LEFT HIP RADIOGRAPHS CLINICAL INDICATION/HISTORY: fall -Additional: Fall with left hip injury and pain.  COMPARISON: None TECHNIQUE: AP pelvis and frog-leg lateral view of left hip. _______________ FINDINGS: BONES: Poorly visualized intertrochanteric fracture of the left hip. Subtle obliquely oriented lucency and foreshortening and mild angulation on the frontal view. Crosstable lateral view limited by body habitus. Bilateral DJD with superior acetabular osteophytosis. SOFT TISSUES: Vascular calcifications. _______________     Intertrochanteric fracture of left hip. Xr Chest Port    Result Date: 5/8/2021  EXAM: FRONTAL CHEST RADIOGRAPH CLINICAL INDICATION/HISTORY: Left hip fracture. Preoperative evaluation. COMPARISON: None TECHNIQUE: Frontal view of the chest _______________ FINDINGS: HEART AND MEDIASTINUM: Unremarkable. LUNGS AND PLEURAL SPACES: Unremarkable. BONY THORAX AND SOFT TISSUES: Minimally displaced fracture left posterolateral seventh rib. Possible adjacent eighth rib fracture without displacement. _______________     Left posterior rib fracture appears acute, without significant displacement. Possible eighth rib fracture as well. No pneumothorax.        Lucrecia Mancilla MD

## 2021-05-11 NOTE — PROGRESS NOTES
Bedside and Verbal shift change report given to JAVY Ordaz (oncoming nurse) by  Bel Maher RN (offgoing nurse).  Report included the following information SBAR, Kardex, Intake/Output, MAR, Recent Results

## 2021-05-11 NOTE — PROGRESS NOTES
Hospitalist Progress Note-critical care note     Patient: Damian Maher MRN: [id number]  CSN: [id number]    YOB: 1950  Age: 79 y.o. Sex: female    DOA: 5/8/2021 LOS:  LOS: 2 days            Chief complaint: Closed left hip fracture, diabetes, hypertension, closed fracture of rib    Assessment/Plan         Hospital Problems  Date Reviewed: 5/8/2021          Codes Class Noted POA    * (Principal) Closed left hip fracture ([address] ICD-10-CM: N61.782B  ICD-9-CM: 820.8  5/8/2021 Unknown        Type 2 diabetes mellitus, without long-term current use of insulin (HCC) ICD-10-CM: E11.9  ICD-9-CM: 250.00  Unknown         Hypertension ICD-10-CM: I10  ICD-9-CM: 401.9  Unknown Unknown        Closed fracture of rib ICD-10-CM: S22.39XA  ICD-9-CM: 807.00  Unknown Unknown        Smoking ICD-10-CM: F17.200  ICD-9-CM: 305.1  5/8/2021 Unknown              Closed left hip fracture  Post day 2 OPEN REDUCTION INTERNAL FIXATION LEFT HIP WITH C-ARM per dr. Elizabeth Hightower   Pain control,   Fall precaution, PT OT  Cm on board for home needs-work-com case        Close to fracture of rib  Pain control, incentive spirometry as tolerated      Hypertension   controlled better add low dose metoprolol      Diabetes  Good controlled Lantus to 10 units   sliding scale     Smoker  Nicotine patch    Mg replacement,     Subjective: pain is better, I will not go to rehab     CM is working for home needs   Disposition :tomorrow   Review of systems:    General: No fevers or chills. Cardiovascular: No chest pain or pressure. No palpitations. Pulmonary: No shortness of breath. Gastrointestinal: No nausea, vomiting.      Vital signs/Intake and Output:  Visit Vitals  BP (!) 159/64   Pulse (!) 110   Temp 98.3 °F (36.8 °C)   Resp 18   Wt 57.4 kg (126 lb 9.6 oz)   SpO2 97%     Current Shift:  05/10 1901 - 05/11 0700  In: -   Out: 400 [Urine:400]  Last three shifts:  05/09 0701 - 05/10 1900  In: 420 [P.O.:420]  Out: 2925 [Urine:2925]    Physical Exam:  General: WD, WN. Alert, cooperative, no acute distress    HEENT: NC, Atraumatic. PERRLA, anicteric sclerae. Lungs: CTA Bilaterally. No Wheezing/Rhonchi/Rales. Heart:  Regular  rhythm,  No murmur, No Rubs, No Gallops  Abdomen: Soft, Non distended, Non tender. +Bowel sounds,   Extremities: No c/c/e  Psych:   Not anxious or agitated. Neurologic:  No acute neurological deficit. Labs: Results:       Chemistry Recent Labs     05/10/21  0537 05/09/21  0335 05/08/21  1130   GLU 75 191* 154*    138 141   K 4.6 3.9 4.1    103 108   CO2 32 29 24   BUN 7 7 7   CREA 0.42* 0.51* 0.57*   CA 8.8 7.9* 8.4*   AGAP 5 6 9   BUCR 17 14 12      CBC w/Diff Recent Labs     05/10/21  0537 05/09/21  0335 05/08/21  1130   WBC 10.8 11.2 8.7   RBC 3.02* 3.20* 3.69*   HGB 10.3* 10.7* 12.4   HCT 31.5* 32.9* 38.0    207 212   GRANS 69 81* 74*   LYMPH 15* 9* 16*   EOS 0 0 1      Cardiac Enzymes No results for input(s): CPK, CKND1, DIONICIO in the last 72 hours. No lab exists for component: CKRMB, TROIP   Coagulation No results for input(s): PTP, INR, APTT, INREXT, INREXT in the last 72 hours. Lipid Panel No results found for: CHOL, CHOLPOCT, CHOLX, CHLST, CHOLV, 610475, HDL, HDLP, LDL, LDLC, DLDLP, 343451, VLDLC, VLDL, TGLX, TRIGL, TRIGP, TGLPOCT, CHHD, CHHDX   BNP No results for input(s): BNPP in the last 72 hours. Liver Enzymes No results for input(s): TP, ALB, TBIL, AP in the last 72 hours. No lab exists for component: SGOT, GPT, DBIL   Thyroid Studies No results found for: T4, T3U, TSH, TSHEXT, TSHEXT     Procedures/imaging: see electronic medical records for all procedures/Xrays and details which were not copied into this note but were reviewed prior to creation of Plan    Xr Hip Lt W Or Wo Pelv 2-3 Vws    Result Date: 5/8/2021  EXAM: LEFT HIP RADIOGRAPHS CLINICAL INDICATION/HISTORY: fall -Additional: Fall with left hip injury and pain.  COMPARISON: None TECHNIQUE: AP pelvis and frog-leg lateral view of left hip. _______________ FINDINGS: BONES: Poorly visualized intertrochanteric fracture of the left hip. Subtle obliquely oriented lucency and foreshortening and mild angulation on the frontal view. Crosstable lateral view limited by body habitus. Bilateral DJD with superior acetabular osteophytosis. SOFT TISSUES: Vascular calcifications. _______________     Intertrochanteric fracture of left hip. Xr Chest Port    Result Date: 5/8/2021  EXAM: FRONTAL CHEST RADIOGRAPH CLINICAL INDICATION/HISTORY: Left hip fracture. Preoperative evaluation. COMPARISON: None TECHNIQUE: Frontal view of the chest _______________ FINDINGS: HEART AND MEDIASTINUM: Unremarkable. LUNGS AND PLEURAL SPACES: Unremarkable. BONY THORAX AND SOFT TISSUES: Minimally displaced fracture left posterolateral seventh rib. Possible adjacent eighth rib fracture without displacement. _______________     Left posterior rib fracture appears acute, without significant displacement. Possible eighth rib fracture as well. No pneumothorax.        Sid Hermosillo MD

## 2021-05-11 NOTE — DISCHARGE SUMMARY
Discharge Summary    Patient: Charis Su               Sex: female          DOA: 5/8/2021         YOB: 1950      Age:  79 y.o.        LOS:  LOS: 3 days                Admit Date: 5/8/2021    Discharge Date: 5/11/2021    Admission Diagnoses: Closed left hip fracture ([facility] [S72.002A]    Discharge Diagnoses:    Problem List as of 5/11/2021 Date Reviewed: 5/8/2021          Codes Class Noted - Resolved    * (Principal) Closed left hip fracture ([facility] ICD-10-CM: Q35.423Q  ICD-9-CM: 820.8  5/8/2021 - Present        Type 2 diabetes mellitus, without long-term current use of insulin ([facility] ICD-10-CM: E11.9  ICD-9-CM: 250.00  Unknown - Present        Hypertension ICD-10-CM: I10  ICD-9-CM: 401.9  Unknown - Present        Closed fracture of rib ICD-10-CM: S22.39XA  ICD-9-CM: 807.00  Unknown - Present        Smoking ICD-10-CM: F17.200  ICD-9-CM: 305.1  5/8/2021 - Present              Discharge Condition: Good    Hospital Course: The patient underwent ORIF left hip on 5/8/2021. The patient tolerated the procedure well. Vital signs remained stable and the patient was transferred to  3rd floor medical  unit without complications. The patient remained neurovascularly intact and began getting out of bed with physical therapy on  POD #1. Pain was controlled with  Percocet pills for break through pain. The patient progressed slowly with physical therapy and was ambulating 30 feet on POD #2 and was therefore discharged home the evening of POD#3 with home health. The patient had begun Lovenox for DVT prophylaxis on POD#1. Consults: Hospitalist and Orthopedics    Discharge disposition: Home.     Significant Diagnostic Studies:   Recent Labs     05/11/21  0338 05/10/21  0537 05/09/21  0335   HGB 9.4* 10.3* 10.7*     Recent Labs     05/11/21 0338 05/10/21  0537 05/09/21  0335   HCT 29.9* 31.5* 32.9*       Current Discharge Medication List      START taking these medications    Details   oxyCODONE-acetaminophen (PERCOCET) 5-325 mg per tablet Take 1-2 Tabs by mouth every four (4) hours as needed for Pain for up to 10 days. Max Daily Amount: 12 Tabs. Qty: 80 Tab, Refills: 0  Start date: 5/10/2021, End date: 5/20/2021    Associated Diagnoses: Closed fracture of left hip, initial encounter ([facility]      aspirin (ASPIRIN) 325 mg tablet Take 2 pills 2 x day for 6 weeks  Qty: 120 Tab, Refills: 1  Start date: 5/10/2021      naloxone [facility]) 4 mg/actuation nasal spray Use 1 spray intranasally, then discard. Repeat with new spray every 2 min as needed for opioid overdose symptoms, alternating nostrils. Qty: 1 Each, Refills: 0  Start date: 5/10/2021         CONTINUE these medications which have NOT CHANGED    Details   lisinopriL (PRINIVIL, ZESTRIL) 10 mg tablet Take  by mouth daily. glipiZIDE (GLUCOTROL) 10 mg tablet Take 10 mg by mouth two (2) times a day. metFORMIN (GLUCOPHAGE) 1,000 mg tablet Take 1,000 mg by mouth two (2) times daily (with meals). Activity: touch down weight bearing Left lower extremity. Use stool softeners at home while taking pain medications since  they are constipating. Diet: Diabetic Diet    Wound Care: Keep wound clean and dry, Reinforce dressing PRN and ice to area for comfort. Do not get wound wet for 5 days.     Follow-up: 10 days with Dr Melva Stearns

## 2021-05-11 NOTE — PROGRESS NOTES
Problem: Self Care Deficits Care Plan (Adult)  Goal: *Acute Goals and Plan of Care (Insert Text)  Description: Occupational Therapy Goals  Initiated 5/11/2021 within 7 day(s). 1.  Patient will perform grooming with supervision/set-up standing at sink for 5 minutes or more while maintaining TTWB at LLE. 2.  Patient will perform lower body dressing with supervision/set-up and use of AEs as needed. 3.  Patient will perform toilet transfers with supervision/set-up. 4.  Patient will perform all aspects of toileting with supervision/set-up. 5.  Patient will participate in upper extremity therapeutic exercise/activities with independence for 10 minutes. 6.  Patient will utilize energy conservation techniques during functional activities with verbal, visual, and tactile cues. OCCUPATIONAL THERAPY EVALUATION    Patient: Lisset Archibald (16 y.o. female)  Date: 5/11/2021  Primary Diagnosis: Closed left hip fracture ([facility] [address] [S72.002A]  Procedure(s) (LRB):  OPEN REDUCTION INTERNAL FIXATION LEFT HIP WITH C-ARM (Left) 3 Days Post-Op   Precautions:   Fall, TTWB(L LE)  PLOF: independent with ADLs and transfers PTA     ASSESSMENT :  Based on the objective data described below, the patient presents with decreased strength, endurance and balance for carryover of ADLs and transfers with safety following above mentioned medical diagnosis. Pt presented supine in bed at the beginning of session. Participated with bed mobility, sit<>stand, lateral stepping at EOB and required cues for proper hand placement and positioning closer to the bed before returning to sitting. Pt was left supine in bed at the end of session in NAD, pain 5/10 at L hip. Patient will benefit from skilled intervention to address the above impairments.   Patient's rehabilitation potential is considered to be Good  Factors which may influence rehabilitation potential include:   []             None noted  []             Mental ability/status  [] Medical condition  []             Home/family situation and support systems  []             Safety awareness  [x]             Pain tolerance/management  []             Other:      PLAN :  Recommendations and Planned Interventions:   [x]               Self Care Training                  [x]      Therapeutic Activities  [x]               Functional Mobility Training   []      Cognitive Retraining  [x]               Therapeutic Exercises           [x]      Endurance Activities  [x]               Balance Training                    []      Neuromuscular Re-Education  []               Visual/Perceptual Training     [x]      Home Safety Training  [x]               Patient Education                   []      Family Training/Education  []               Other (comment):    Frequency/Duration: Patient will be followed by occupational therapy 1-2 times per day/4-7 days per week to address goals. Discharge Recommendations: Home Health  Further Equipment Recommendations for Discharge: transfer bench and rolling walker     SUBJECTIVE:   Patient stated I am doing alright.     OBJECTIVE DATA SUMMARY:     Past Medical History:   Diagnosis Date    Diabetes ([facility] [address]     Hypertension    History reviewed. No pertinent surgical history. Barriers to Learning/Limitations: None  Compensate with: visual, verbal, tactile, kinesthetic cues/model    Home Situation:   Home Situation  Home Environment: Trailer/mobile home  # Steps to Enter: 3  Rails to Enter: Yes  Hand Rails : Bilateral  One/Two Story Residence: One story  Living Alone: Yes  Support Systems: Friends \ neighbors  Patient Expects to be Discharged to[de-identified] Trailer/mobile home  Current DME Used/Available at Home: None  Tub or Shower Type: Tub/Shower combination  []  Right hand dominant   []  Left hand dominant    Cognitive/Behavioral Status:  Neurologic State: Alert  Orientation Level: Oriented X4  Cognition: Appropriate for age attention/concentration; Follows commands  Safety/Judgement: Fall prevention    Skin: intact  Edema: none    Vision/Perceptual:    Tracking: Able to track stimulus in all quadrants w/o difficulty    Coordination: BUE  Coordination: Within functional limits  Fine Motor Skills-Upper: Left Intact; Right Intact    Gross Motor Skills-Upper: Left Intact; Right Intact  Balance:  Sitting: Intact  Standing: Intact; With support  Standing - Static: Good  Standing - Dynamic : Fair    Strength: BUE  Strength: Generally decreased, functional  Tone & Sensation: BUE  Tone: Normal  Sensation: Intact  Range of Motion: BUE  AROM: Generally decreased, functional  Functional Mobility and Transfers for ADLs:  Bed Mobility:  Rolling: Contact guard assistance  Supine to Sit: Contact guard assistance  Sit to Supine: Contact guard assistance  Scooting: Contact guard assistance  Transfers:  Sit to Stand: Contact guard assistance  Stand to Sit: Contact guard assistance  ADL Assessment:   Feeding: Independent    Oral Facial Hygiene/Grooming: Contact guard assistance    Upper Body Dressing: Supervision    Lower Body Dressing: Minimum assistance    Toileting: Contact guard assistance;Minimum assistance  ADL Intervention:  Lower Body Dressing Assistance  Dressing Assistance: Minimum assistance  Socks: Minimum assistance  Leg Crossed Method Used: No  Position Performed: Long sitting on bed  Cues: Don    Cognitive Retraining  Safety/Judgement: Fall prevention    Therapeutic Exercise:    Pain:  Pain level pre-treatment: 5/10   Pain level post-treatment: 5/10   Pain Intervention(s): Medication (see MAR); Rest, Ice, Repositioning   Response to intervention: Nurse notified, See doc flow    Activity Tolerance:   Good     Please refer to the flowsheet for vital signs taken during this treatment.   After treatment:   [] Patient left in no apparent distress sitting up in chair  [x] Patient left in no apparent distress in bed  [x] Call bell left within reach  [x] Nursing notified  [] Caregiver present  [] Bed alarm activated    COMMUNICATION/EDUCATION:   [x] Role of Occupational Therapy in the acute care setting  [x] Home safety education was provided and the patient/caregiver indicated understanding. [x] Patient/family have participated as able in goal setting and plan of care. [] Patient/family agree to work toward stated goals and plan of care. [] Patient understands intent and goals of therapy, but is neutral about his/her participation. [] Patient is unable to participate in goal setting and plan of care. Thank you for this referral.  Kel Rodriguez OTR/L  Time Calculation: 20 mins    Eval Complexity: History: MEDIUM Complexity : Expanded review of history including physical, cognitive and psychosocial  history ; Examination: MEDIUM Complexity : 3-5 performance deficits relating to physical, cognitive , or psychosocial skils that result in activity limitations and / or participation restrictions; Decision Making:MEDIUM Complexity : Patient may present with comorbidities that affect occupational performnce.  Miniml to moderate modification of tasks or assistance (eg, physical or verbal ) with assesment(s) is necessary to enable patient to complete evaluation

## 2021-05-11 NOTE — PROGRESS NOTES
Problem: Falls - Risk of  Goal: *Absence of Falls  Description: Document Brenda Stoll Fall Risk and appropriate interventions in the flowsheet.   Outcome: Progressing Towards Goal  Note: Fall Risk Interventions:  Mobility Interventions: Patient to call before getting OOB    Mentation Interventions: Adequate sleep, hydration, pain control    Medication Interventions: Teach patient to arise slowly, Patient to call before getting OOB    Elimination Interventions: Call light in reach, Patient to call for help with toileting needs    History of Falls Interventions: Room close to nurse's station, Door open when patient unattended

## 2021-05-11 NOTE — PROGRESS NOTES
Bedside and Verbal shift change report given to [address] (oncoming nurse) by Corbin Martinez (offgoing nurse). Report included the following information SBAR, Kardex, Intake/Output, MAR and Recent Results. Shift Summary-   Patient Vitals for the past 12 hrs:   Temp Pulse Resp BP SpO2   05/11/21 0304 98 °F (36.7 °C) 98 18 128/60 100 %   05/10/21 2117 98.3 °F (36.8 °C) (!) 110 18 (!) 159/64 97 %   Pt given PRN percocet x2 overnight for pain to L hip. Bedside and Verbal shift change report given to CHRISTOPHER Fairbanks (oncoming nurse) by [address] (offgoing nurse). Report included the following information SBAR, Kardex, Intake/Output, MAR and Recent Results.

## 2021-05-11 NOTE — PROGRESS NOTES
Progress Note POD #3      Patient: Abimael Allen               Sex: female          DOA: 5/8/2021         YOB: 1950      Surgery: Procedure(s):  OPEN REDUCTION INTERNAL FIXATION LEFT HIP WITH C-ARM           LOS: 3 days               Subjective:     No new complaints    Objective:      Visit Vitals  /60   Pulse 98   Temp 98 °F (36.7 °C)   Resp 18   Wt 57.4 kg (126 lb 9.6 oz)   SpO2 100%       Physical Exam:  Neurological: Dressing C/D/I.                            sensation: intact to light touch. No calf pain to palpation. Patient mobility  Bed Mobility Training  Rolling: Moderate assistance  Supine to Sit: Minimum assistance, Additional time  Sit to Supine: Minimum assistance, Additional time  Scooting: Stand-by assistance  Transfer Training  Sit to Stand: Contact guard assistance, Minimum assistance(vc)  Stand to Sit: Contact guard assistance, Minimum assistance(vc)      Gait Training  Assistive Device: Gait belt, Walker, rolling  Ambulation - Level of Assistance: Minimal assistance, Contact guard assistance, Other (comment)  Distance (ft): 36 Feet (ft)  Interventions: Safety awareness training, Tactile cues, Verbal cues, Visual/Demos   Weight Bearing Status  Left Side Weight Bearing: Toe touch   Patient Response  Patient Response: fair     Intake and Output:  Current Shift:  No intake/output data recorded.   Last three shifts:  05/09 1901 - 05/11 0700  In: 5 [P.O.:420]  Out: 3625 [Urine:3625]    Lab/Data Reviewed:  Lab Results   Component Value Date/Time    WBC 9.5 05/11/2021 03:38 AM    HGB 9.4 (L) 05/11/2021 03:38 AM    HCT 29.9 (L) 05/11/2021 03:38 AM    PLATELET 035 57/48/2976 03:38 AM    .5 (H) 05/11/2021 03:38 AM     No results found for: APTT  No results found for: INR, PTMR, PTP, PT1, PT2, INREXT       Assessment/Plan     Principal Problem:    Closed left hip fracture ([address] (5/8/2021)    Active Problems:    Type 2 diabetes mellitus, without long-term current use of insulin (HCC) ()      Hypertension ()      Closed fracture of rib ()      Smoking (5/8/2021)        1. Stable  2. OOB with PT  3. D/C Planning  4. Change dressing prior to discharge home. 5.Discharge to home after being cleared by P.T  6. Follow-up in 10 days at [facility] with Dr. Geroge Severin.

## 2021-05-12 VITALS
WEIGHT: 124.1 LBS | HEART RATE: 100 BPM | TEMPERATURE: 97.8 F | DIASTOLIC BLOOD PRESSURE: 53 MMHG | RESPIRATION RATE: 18 BRPM | SYSTOLIC BLOOD PRESSURE: 148 MMHG | OXYGEN SATURATION: 100 %

## 2021-05-12 LAB
GLUCOSE BLD STRIP.AUTO-MCNC: 124 MG/DL (ref 70–110)
GLUCOSE BLD STRIP.AUTO-MCNC: 151 MG/DL (ref 70–110)
GLUCOSE BLD STRIP.AUTO-MCNC: 175 MG/DL (ref 70–110)

## 2021-05-12 PROCEDURE — 82962 GLUCOSE BLOOD TEST: CPT

## 2021-05-12 PROCEDURE — 74011250636 HC RX REV CODE- 250/636: Performed by: HOSPITALIST

## 2021-05-12 PROCEDURE — 74011636637 HC RX REV CODE- 636/637: Performed by: HOSPITALIST

## 2021-05-12 PROCEDURE — 74011250637 HC RX REV CODE- 250/637: Performed by: HOSPITALIST

## 2021-05-12 PROCEDURE — 74011250637 HC RX REV CODE- 250/637: Performed by: ORTHOPAEDIC SURGERY

## 2021-05-12 RX ADMIN — LISINOPRIL 10 MG: 5 TABLET ORAL at 09:09

## 2021-05-12 RX ADMIN — OXYCODONE HYDROCHLORIDE AND ACETAMINOPHEN 2 TABLET: 5; 325 TABLET ORAL at 09:33

## 2021-05-12 RX ADMIN — OXYCODONE HYDROCHLORIDE AND ACETAMINOPHEN 2 TABLET: 5; 325 TABLET ORAL at 04:08

## 2021-05-12 RX ADMIN — METOPROLOL TARTRATE 12.5 MG: 25 TABLET, FILM COATED ORAL at 09:09

## 2021-05-12 RX ADMIN — Medication 10 ML: at 06:47

## 2021-05-12 RX ADMIN — HYDRALAZINE HYDROCHLORIDE 10 MG: 20 INJECTION INTRAMUSCULAR; INTRAVENOUS at 04:08

## 2021-05-12 RX ADMIN — INSULIN LISPRO 2 UNITS: 100 INJECTION, SOLUTION INTRAVENOUS; SUBCUTANEOUS at 00:24

## 2021-05-12 RX ADMIN — SENNOSIDES 8.6 MG: 8.6 TABLET, FILM COATED ORAL at 09:09

## 2021-05-12 RX ADMIN — INSULIN LISPRO 2 UNITS: 100 INJECTION, SOLUTION INTRAVENOUS; SUBCUTANEOUS at 13:05

## 2021-05-12 NOTE — PROGRESS NOTES
Problem: Falls - Risk of  Goal: *Absence of Falls  Description: Document Luz Marina Brown Fall Risk and appropriate interventions in the flowsheet.   Outcome: Resolved/Met     Problem: Patient Education: Go to Patient Education Activity  Goal: Patient/Family Education  Outcome: Resolved/Met     Problem: Hip Replacement: Day of Surgery/Unit  Goal: Activity/Safety  Outcome: Resolved/Met  Goal: Consults, if ordered  Outcome: Resolved/Met  Goal: Diagnostic Test/Procedures  Outcome: Resolved/Met  Goal: Nutrition/Diet  Outcome: Resolved/Met  Goal: Medications  Outcome: Resolved/Met  Goal: Respiratory  Outcome: Resolved/Met  Goal: Treatments/Interventions/Procedures  Outcome: Resolved/Met  Goal: Psychosocial  Outcome: Resolved/Met  Goal: *Initiate mobility  Outcome: Resolved/Met  Goal: *Optimal pain control at patient's stated goal  Outcome: Resolved/Met  Goal: *Hemodynamically stable  Outcome: Resolved/Met     Problem: Patient Education: Go to Patient Education Activity  Goal: Patient/Family Education  Outcome: Resolved/Met     Problem: Patient Education: Go to Patient Education Activity  Goal: Patient/Family Education  Outcome: Resolved/Met

## 2021-05-12 NOTE — DISCHARGE INSTRUCTIONS
Patient Education     Learning About Diabetes and Your Teeth  How does diabetes affect your teeth and gums? When you have diabetes, managing blood sugar levels and taking good care of your teeth and gums are both important. When blood sugar levels are high, there's a greater risk for:  · Gum (periodontal) disease. · Tooth decay. · Fungal infections in the mouth, like thrush. · Dry mouth, or xerostomia (say \"allen-shai-STO-tavo-uh\"). The mouth needs saliva to neutralize the acids in your mouth. These acids can lead to gum disease and tooth decay. Keeping your blood sugar levels in your target range can help prevent problems with the teeth and gums. If you have any problems with your teeth or gums, see your dentist.  How do you care for your teeth and gums when you have diabetes? · Brush your teeth twice a day. · Floss daily. Make sure to press the floss against your teeth and not your gums. · Check each day for areas where your gums might be red or painful. Be sure to let your dentist know of any sores in your mouth. · See your dentist regularly for professional cleaning of your teeth and to look for gum problems. Many dentists recommend getting checkups twice a year. Remind your dentist that you have diabetes before any work is done. · Don't smoke or use smokeless tobacco. Tobacco use with diabetes can lead to a greater risk of severe gum disease. If you need help quitting, talk to your doctor about stop-smoking programs and medicines. These can increase your chances of quitting for good. Follow-up care is a key part of your treatment and safety. Be sure to make and go to all appointments, and call your doctor if you are having problems. It's also a good idea to know your test results and keep a list of the medicines you take. Where can you learn more? Go to [employer]  Enter H523 in the search box to learn more about \"Learning About Diabetes and Your Teeth. \"   © 6873-9522 Healthwise, Incorporated. Care instructions adapted under license by [facility] (which disclaims liability or warranty for this information). This care instruction is for use with your licensed healthcare professional. If you have questions about a medical condition or this instruction, always ask your healthcare professional. [address] any warranty or liability for your use of this information. Content Version: 52.9.046074; Current as of: May 22, 2015           Patient Education     Nutrition Tips for Diabetes: After Your Visit  Your Care Instructions  A healthy diet is important to manage diabetes. It helps you lose weight (if you need to) and keep it off. It gives you the nutrition and energy your body needs and helps prevent heart disease. But a diet for diabetes does not mean that you have to eat special foods. You can eat what your family eats, including occasional sweets and other favorites. But you do have to pay attention to how often you eat and how much you eat of certain foods. The right plan for you will give you meals that help you keep your blood sugar at healthy levels. Try to eat a variety of foods and to spread carbohydrate throughout the day. Carbohydrate raises blood sugar higher and more quickly than any other nutrient does. Carbohydrate is found in sugar, breads and cereals, fruit, starchy vegetables such as potatoes and corn, and milk and yogurt. You may want to work with a dietitian or diabetes educator to help you plan meals and snacks. A dietitian or diabetes educator also can help you lose weight if that is one of your goals. The following tips can help you enjoy your meals and stay healthy. Follow-up care is a key part of your treatment and safety. Be sure to make and go to all appointments, and call your doctor if you are having problems. Its also a good idea to know your test results and keep a list of the medicines you take.   How can you care for yourself at home? · Learn which foods have carbohydrate and how much carbohydrate to eat. A dietitian or diabetes educator can help you learn to keep track of how much carbohydrate you eat. · Spread carbohydrate throughout the day. Eat some carbohydrate at all meals, but do not eat too much at any one time. · Plan meals to include food from all the food groups. These are the food groups and some example portion sizes:  ¨ Grains: 1 slice of bread (1 ounce), ½ cup of cooked cereal, and 1/3 cup of cooked pasta or rice. These have about 15 grams of carbohydrate in a serving. Choose whole grains such as whole wheat bread or crackers, oatmeal, and brown rice more often than refined grains. ¨ Fruit: 1 small fresh fruit, such as an apple or orange; ½ of a banana; ½ cup of chopped, cooked, or canned fruit; ½ cup of fruit juice; 1 cup of melon or raspberries; and 2 tablespoons of dried fruit. These have about 15 grams of carbohydrate in a serving. ¨ Dairy: 1 cup of nonfat or low-fat milk and 2/3 cup of plain yogurt. These have about 15 grams of carbohydrate in a serving. ¨ Protein foods: Beef, chicken, turkey, fish, eggs, tofu, cheese, cottage cheese, and peanut butter. A serving size of meat is 3 ounces, which is about the size of a deck of cards. Examples of meat substitute serving sizes (equal to 1 ounce of meat) are 1/4 cup of cottage cheese, 1 egg, 1 tablespoon of peanut butter, and ½ cup of tofu. These have very little or no carbohydrate per serving. ¨ Vegetables: Starchy vegetables such as ½ cup of cooked dried beans, peas, potatoes, or corn have about 15 grams of carbohydrate. Nonstarchy vegetables have very little carbohydrate, such as 1 cup of raw leafy vegetables (such as spinach), ½ cup of other vegetables (cooked or chopped), and 3/4 cup of vegetable juice. · Use the plate format to plan meals.  It is a good, quick way to make sure that you have a balanced meal. It also helps you spread carbohydrate throughout the day. You divide your plate by types of foods. Put vegetables on half the plate, meat or meat substitutes on one-quarter of the plate, and a grain or starchy vegetable (such as brown rice or a potato) in the final quarter of the plate. To this you can add a small piece of fruit and 1 cup of milk or yogurt, depending on how much carbohydrate you are supposed to eat at a meal.  · Talk to your dietitian or diabetes educator about ways to add limited amounts of sweets into your meal plan. You can eat these foods now and then, as long as you include the amount of carbohydrate they have in your daily carbohydrate allowance. · If you drink alcohol, limit it to no more than 1 drink a day for women and 2 drinks a day for men. If you are pregnant, no amount of alcohol is known to be safe. · Protein, fat, and fiber do not raise blood sugar as much as carbohydrate does. If you eat a lot of these nutrients in a meal, your blood sugar will rise more slowly than it would otherwise. · Limit saturated fats, such as those from meat and dairy products. Try to replace it with monounsaturated fat, such as olive oil. This is a healthier choice because people who have diabetes are at higher-than-average risk of heart disease. But use a modest amount of olive oil. A tablespoon of olive oil has 14 grams of fat and 120 calories. · Exercise lowers blood sugar. If you take insulin by shots or pump, you can use less than you would if you were not exercising. Keep in mind that timing matters. If you exercise within 1 hour after a meal, your body may need less insulin for that meal than it would if you exercised 3 hours after the meal. Test your blood sugar to find out how exercise affects your need for insulin. · Exercise on most days of the week. Aim for at least 30 minutes. Exercise helps you stay at a healthy weight and helps your body use insulin. Walking is an easy way to get exercise. Gradually increase the amount you walk every day. You also may want to swim, bike, or do other activities. When you eat out  · Learn to estimate the serving sizes of foods that have carbohydrate. If you measure food at home, it will be easier to estimate the amount in a serving of restaurant food. · If the meal you order has too much carbohydrate (such as potatoes, corn, or baked beans), ask to have a low-carbohydrate food instead. Ask for a salad or green vegetables. · If you use insulin, check your blood sugar before and after eating out to help you plan how much to eat in the future. · If you eat more carbohydrate at a meal than you had planned, take a walk or do other exercise. This will help lower your blood sugar. Where can you learn more? Go to [employer]be  Enter C825 in the search box to learn more about \"Nutrition Tips for Diabetes: After Your Visit. \"   © 9242-2792 Healthwise, [employer]. Care instructions adapted under license by [facility] [employer] (which disclaims liability or warranty for this information). This care instruction is for use with your licensed healthcare professional. If you have questions about a medical condition or this instruction, always ask your healthcare professional. [place] [address] any warranty or liability for your use of this information. Content Version: 04.5.294781; Current as of: June 4, 2014                 Patient Education        Surgery to Repair a Hip Fracture: What to Expect at Home  Your Recovery     Surgery for a hip fracture repairs a broken hip bone. When you leave the hospital after surgery, you will probably be walking with crutches or a walker. You may be able to climb a few stairs and get in and out of bed and chairs. But you will need someone to help you at home for the next few weeks or until you have more energy and can move around better. If there is no one to help you at home, you may go to a rehabilitation center or long-term care center.   You will go home with a bandage and stitches or staples. You can remove the bandage when your doctor tells you to. Your doctor will remove your stitches or staples 10 days to 3 weeks after your surgery. You may still have some mild pain, and the area may be swollen for 3 to 4 months after surgery. Your doctor will give you medicine for the pain. You will continue the rehabilitation program (rehab) you started in the hospital. The better you do with your rehab exercises, the quicker you will get your strength and movement back. Most people are able to return to work 4 weeks to 4 months after surgery. But it may take 6 months to 1 year for you to fully recover. Some people, especially older people, are never able to move quite as well as they used to. You heal best when you take good care of yourself. Eat a variety of healthy foods, and don't smoke. This care sheet gives you a general idea about how long it will take for you to recover. But each person recovers at a different pace. Follow the steps below to get better as quickly as possible. How can you care for yourself at home? Activity    · Rest when you feel tired. You may take a nap, but don't stay in bed all day.     · Work with your physical therapist to learn the best way to exercise. You may be able to take frequent, short walks using crutches or a walker. You will probably have to use crutches or a walker for at least 4 to 6 weeks. After that, you may need to use a cane to help you walk.     · Do not sit for longer than 30 to 45 minutes at a time. When you sit, use chairs with arms, and don't sit in low chairs.     · Sleep on your back with your legs slightly apart or on your side with a pillow between your knees for about 6 weeks or as your doctor tells you. Don't sleep on your stomach or affected hip.     · You may need to take sponge baths until your stitches or staples have been removed. You will probably be able to shower 24 hours after they are removed.  Ask your doctor when it is okay to bathe or shower.     · Ask your doctor when you can drive again.     · Most people are able to return to work 4 weeks to 4 months after surgery.     · Ask your doctor when it is okay for you to have sex.     · Do not lift anything that would make you strain. This may include heavy grocery bags and milk containers, a heavy briefcase or backpack, cat litter or dog food bags, a vacuum , or a child. Diet    · By the time you leave the hospital, you will probably be eating your normal diet. If your stomach is upset, try bland, low-fat foods like plain rice, broiled chicken, toast, and yogurt. Your doctor may recommend that you take iron and vitamin supplements.     · Continue to drink plenty of fluids.     · Eat healthy foods, and watch your portion sizes. Try to stay at your ideal weight. Too much weight puts more stress on your hip joint.     · You may notice that your bowel movements are not regular right after your surgery. This is common. Try to avoid constipation and straining with bowel movements. You may want to take a fiber supplement every day. If you have not had a bowel movement after a couple of days, ask your doctor about taking a mild laxative.     · Your doctor may want you to take calcium supplements and eat foods high in calcium, such as milk, cheese, ice cream, and salmon with bones. These help stop bone loss. Orange juice and soy milk with added calcium are also good choices. Medicines    · Your doctor will tell you if and when you can restart your medicines. You will also be given instructions about taking any new medicines.     · If you take aspirin or some other blood thinner, ask your doctor if and when to start taking it again. Make sure that you understand exactly what your doctor wants you to do.     · Your doctor may give you a blood-thinning medicine to prevent blood clots.  If you take a blood thinner, be sure you get instructions about how to take your medicine safely. Blood thinners can cause serious bleeding problems. This medicine could be in pill form or as a shot (injection). If a shot is necessary, your doctor will tell you how to do this.     · Be safe with medicines. Take pain medicines exactly as directed. ? If the doctor gave you a prescription medicine for pain, take it as prescribed. ? If you are not taking a prescription pain medicine, ask your doctor if you can take an over-the-counter medicine.     · If you think your pain medicine is making you sick to your stomach:  ? Take your medicine after meals (unless your doctor has told you not to). ? Ask your doctor for a different pain medicine.     · If your doctor prescribed antibiotics, take them as directed. Do not stop taking them just because you feel better. You need to take the full course of antibiotics.     · Your doctor may also prescribe medicines or calcium supplements to make your bones stronger. Incision care    · You will have a bandage over the cut (incision) and staples or stitches. If there is no drainage, most doctors will let you take the bandage off in a few days.     · Your doctor will remove the staples or stitches 10 days to 3 weeks after the surgery and replace them with strips of tape. Leave the tape on for a week or until it falls off. Exercise    · Your rehab program will include a number of exercises to do. Always do them as your therapist tells you.     · Do not do any vigorous exercise for 12 weeks or until your doctor tells you it is okay. Ice and elevation    · For pain, put ice or a cold pack on the area for 10 to 20 minutes at a time. Put a thin cloth between the ice and your skin.     · Your ankle may swell for about 3 months. Prop up your ankle when you ice it or anytime you sit or lie down. Try to keep it above the level of your heart. This will help reduce swelling. Other instructions    · Continue to wear your support stockings as your doctor says.  These help to prevent blood clots. The length of time that you will have to wear them depends on your activity level and the amount of swelling you have. Most people wear these stockings for 4 to 6 weeks after surgery.     · Follow these tips to prevent falls:  ? Arrange furniture so that you won't trip on it. ? Get rid of throw rugs, and move electrical cords out of the way. ? Walk only in areas with plenty of light. ? Put grab bars in showers and bathtubs. ? Avoid icy or snowy sidewalks. ? Wear shoes with sturdy, flat soles. Follow-up care is a key part of your treatment and safety. Be sure to make and go to all appointments, and call your doctor if you are having problems. It's also a good idea to know your test results and keep a list of the medicines you take. When should you call for help? Call 911 anytime you think you may need emergency care. For example, call if:    · You passed out (lost consciousness).     · You have severe trouble breathing.     · You have sudden chest pain and shortness of breath, or you cough up blood. Call your doctor now or seek immediate medical care if:    · Your leg or foot is cool or pale or changes color.     · You cannot feel or move your leg.     · You have signs of a blood clot, such as:  ? Pain in your calf, back of the knee, thigh, or groin. ? Redness and swelling in your leg or groin.     · Your incision comes open and begins to bleed, or the bleeding increases.     · You feel like your heart is racing or beating irregularly.     · You have signs of infection, such as:  ? Increased pain, swelling, warmth, or redness. ? Red streaks leading from the incision. ? Pus draining from the incision. ? A fever. Watch closely for any changes in your health, and be sure to contact your doctor if:    · You do not have a bowel movement after taking a laxative.     · You do not get better as expected. Where can you learn more?   Go to http://www.gray.com/  Enter L281 in the search box to learn more about \"Surgery to Repair a Hip Fracture: What to Expect at Home. \"  Current as of: November 16, 2020               Content Version: 12.8  © 1643-9064 [email or web address]. Care instructions adapted under license by [employer] (which disclaims liability or warranty for this information). If you have questions about a medical condition or this instruction, always ask your healthcare professional. [place] [address] any warranty or liability for your use of this information. Touch down weight bearing left lower extremity. Change dressing daily . Do not get dressing wet. Take stool softeners daily while taking pain medications, since pain medicines are constipating. Your A1C  was   Lab Results   Component Value Date/Time    Hemoglobin A1c 6.1 (H) 05/08/2021 11:30 AM      This lab test reflects that your blood sugar averaged 128 mg/dL  over the past 3 months. It is important to follow up with your provider on a routine basis to continue to evaluate your blood sugar discuss any necessary changes in treatment.

## 2021-05-12 NOTE — PROGRESS NOTES
D/C Plan: HH, RW and physician follow up (to be arranged by workman's comp)    CM spoke with Neida Vieira ([phone]), who is assisting with the RW, and she has indicated she is now looking for another DME company that will deliver today as she has just been notified the previous DME company is unable to deliver. Ms. Sadie Tavarez has indicated she can coordinate with [facility] if need be but they are unable to deliver and someone would have to  the RW. CM will contact pt's employer to see if they are able to assist with this. CM attempted to contact pt's newly assigned CM with [employer],  Mya Mcgregor ([phone]), and left a message requesting a return call to discuss [facility]. CM to continue to follow and assist.     [id number]:  CM spoke with pt's employer/supervisor, Sky Castillo ([phone]), and provided an update. Pt's employer has a RW pt can use until her RW is delivered. CM attempted to contact Neida Vieira ([phone]), who is assisting with the RW, and left a vm requesting the RW be delivered to the pt's home. Anticipate pt will transition home today with HH, to be arranged by workman's comp. Pt's employer will transport her home today after 2:00pm.  CM to continue to follow and assist.    Care Management Interventions  PCP Verified by CM: Yes  Mode of Transport at Discharge: Other (see comment)(friends/coworkers)  Transition of Care Consult (CM Consult): Discharge Planning  Health Maintenance Reviewed: Yes  Physical Therapy Consult: Yes  Occupational Therapy Consult: Yes  Current Support Network:  Other(lives with significant other)  Confirm Follow Up Transport: Friends  The Plan for Transition of Care is Related to the Following Treatment Goals :  HH, RW and physician follow up ([employer])  The Patient and/or Patient Representative was Provided with a Choice of Provider and Agrees with the Discharge Plan?: Yes  Name of the Patient Representative Who was Provided with a Choice of Provider and Agrees with the Discharge Plan: workman's comp  Freedom of Choice List was Provided with Basic Dialogue that Supports the Patient's Individualized Plan of Care/Goals, Treatment Preferences and Shares the Quality Data Associated with the Providers?: Yes  Discharge Location  Discharge Placement: Home with home health

## 2021-05-12 NOTE — DISCHARGE SUMMARY
Discharge Summary    Patient: Beto Reynolds MRN: [id number]  CSN: [id number]    YOB: 1950  Age: 79 y.o. Sex: female    DOA: 5/8/2021 LOS:  LOS: 4 days   Discharge Date:      Primary Care Provider:  Adore Ashton NP    Admission Diagnoses: Closed left hip fracture [facility]) [S72.002A]    Discharge Diagnoses:    Hospital Problems  Date Reviewed: 5/8/2021          Codes Class Noted POA    * (Principal) Closed left hip fracture [facility]) ICD-10-CM: F01.353M  ICD-9-CM: 820.8  5/8/2021 Unknown        Type 2 diabetes mellitus, without long-term current use of insulin ([facility] ICD-10-CM: E11.9  ICD-9-CM: 250.00  Unknown         Hypertension ICD-10-CM: I10  ICD-9-CM: 401.9  Unknown Unknown        Closed fracture of rib ICD-10-CM: S22.39XA  ICD-9-CM: 807.00  Unknown Unknown        Smoking ICD-10-CM: F17.200  ICD-9-CM: 305.1  5/8/2021 Unknown              Discharge Condition: stable     Discharge Medications:     Current Discharge Medication List      START taking these medications    Details   oxyCODONE-acetaminophen (PERCOCET) 5-325 mg per tablet Take 1-2 Tabs by mouth every four (4) hours as needed for Pain for up to 10 days. Max Daily Amount: 12 Tabs. Qty: 80 Tab, Refills: 0  Start date: 5/10/2021, End date: 5/20/2021    Associated Diagnoses: Closed fracture of left hip, initial encounter ([facility]      aspirin (ASPIRIN) 325 mg tablet Take 2 pills 2 x day for 6 weeks  Qty: 120 Tab, Refills: 1  Start date: 5/10/2021      naloxone [facility]) 4 mg/actuation nasal spray Use 1 spray intranasally, then discard. Repeat with new spray every 2 min as needed for opioid overdose symptoms, alternating nostrils. Qty: 1 Each, Refills: 0  Start date: 5/10/2021         CONTINUE these medications which have NOT CHANGED    Details   lisinopriL (PRINIVIL, ZESTRIL) 10 mg tablet Take  by mouth daily. glipiZIDE (GLUCOTROL) 10 mg tablet Take 10 mg by mouth two (2) times a day.       metFORMIN (GLUCOPHAGE) 1,000 mg tablet Take 1,000 mg by mouth two (2) times daily (with meals). Procedures : OPEN REDUCTION INTERNAL FIXATION LEFT HIP WITH C-ARM per dr. Raj Kellogg     Consults: Orthopedics      PHYSICAL EXAM   Visit Vitals  BP (!) 146/61   Pulse (!) 109   Temp 98.3 °F (36.8 °C)   Resp 17   Wt 56.3 kg (124 lb 1.6 oz)   SpO2 99%     General: Awake, cooperative, no acute distress    HEENT: NC, Atraumatic. PERRLA, EOMI. Anicteric sclerae. Lungs:  CTA Bilaterally. No Wheezing/Rhonchi/Rales. Heart:  Regular  rhythm,  No murmur, No Rubs, No Gallops  Abdomen: Soft, Non distended, Non tender. +Bowel sounds,   Extremities: No c/c. Left hip covered with gauze   Psych:   Not anxious or agitated. Neurologic:  No acute neurological deficits. Admission HPI :   Raquel Holguin is a 79 y.o. female with history of diabetes, hypertension, smoker presented to ER after fall. She tripped per her coworker, she fell on the floor. Unable to stand up after fall. X-ray indicated  Left posterior rib fracture appears acute andIntertrochanteric fracture of left hip. Orthopedics was consulted per ER physician. She denies any COVID-19 exposure. Not receiving vaccine-she does not want to be vaccinated. BP was elevated in ER. Pain meds and labetalol were administrated. No chest pain, no sob,    Hospital Course :   Raquel Holguin is a 79 y.o. female with history of diabetes, hypertension, smoker was admitted for hip fracture. Orthopedics was consulted. OPEN REDUCTION INTERNAL FIXATION LEFT HIP WITH C-ARM per dr. Raj Kellogg . She tolerated the procedure well. She received PT/OT evaluation. Recommend home health. We also controlled her bp during her stay. Her dm was controlled per insulin. Strongly recommend to stop smoking. Discharge planning discussed with patient, pt agrees  with the plan and no questions and concerns at this point.        Activity: Activity as tolerated    Diet: Diabetic Diet    Follow-up: PCP and ortho     Disposition: home with home health     Minutes spent on discharge: 45 min       Labs: Results:       Chemistry Recent Labs     05/11/21  0338 05/10/21  0537   * 75    140   K 4.1 4.6    103   CO2 31 32   BUN 7 7   CREA 0.51* 0.42*   CA 8.3* 8.8   AGAP 7 5   BUCR 14 17      CBC w/Diff Recent Labs     05/11/21  0338 05/10/21  0537   WBC 9.5 10.8   RBC 2.86* 3.02*   HGB 9.4* 10.3*   HCT 29.9* 31.5*    196   GRANS 68 69   LYMPH 16* 15*   EOS 1 0      Cardiac Enzymes No results for input(s): CPK, CKND1, DIONICIO in the last 72 hours. No lab exists for component: CKRMB, TROIP   Coagulation No results for input(s): PTP, INR, APTT, INREXT in the last 72 hours. Lipid Panel No results found for: CHOL, CHOLPOCT, CHOLX, CHLST, CHOLV, 177796, HDL, HDLP, LDL, LDLC, DLDLP, 991420, VLDLC, VLDL, TGLX, TRIGL, TRIGP, TGLPOCT, CHHD, CHHDX   BNP No results for input(s): BNPP in the last 72 hours. Liver Enzymes No results for input(s): TP, ALB, TBIL, AP in the last 72 hours. No lab exists for component: SGOT, GPT, DBIL   Thyroid Studies No results found for: T4, T3U, TSH, TSHEXT       @micro    Significant Diagnostic Studies: Xr Hip Lt W Or Wo Pelv 2-3 Vws    Result Date: 5/8/2021  EXAM: LEFT HIP RADIOGRAPHS CLINICAL INDICATION/HISTORY: fall -Additional: Fall with left hip injury and pain. COMPARISON: None TECHNIQUE: AP pelvis and frog-leg lateral view of left hip. _______________ FINDINGS: BONES: Poorly visualized intertrochanteric fracture of the left hip. Subtle obliquely oriented lucency and foreshortening and mild angulation on the frontal view. Crosstable lateral view limited by body habitus. Bilateral DJD with superior acetabular osteophytosis. SOFT TISSUES: Vascular calcifications. _______________     Intertrochanteric fracture of left hip. Xr Chest Port    Result Date: 5/8/2021  EXAM: FRONTAL CHEST RADIOGRAPH CLINICAL INDICATION/HISTORY: Left hip fracture. Preoperative evaluation. COMPARISON: None TECHNIQUE: Frontal view of the chest _______________ FINDINGS: HEART AND MEDIASTINUM: Unremarkable. LUNGS AND PLEURAL SPACES: Unremarkable. BONY THORAX AND SOFT TISSUES: Minimally displaced fracture left posterolateral seventh rib. Possible adjacent eighth rib fracture without displacement. _______________     Left posterior rib fracture appears acute, without significant displacement. Possible eighth rib fracture as well. No pneumothorax. Beatriz Reza Technologist Service    Result Date: 5/10/2021  See impression. Fluoroscopy was provided for this procedure under the supervision and/or direction of the attending provider. ? For further information regarding this procedure, see patient medical record.              [facility] [place]     CC: Faustina Snowden NP

## 2025-04-16 NOTE — PROGRESS NOTES
Case discussed with Dr. Ariana Phipps for admission, recommend to control bp before sending pt to the floor Improved

## (undated) DEVICE — BIT DRL RMFG 3.2X145MM DISP --

## (undated) DEVICE — BANDAGE,GAUZE,CONFORMING,4"X75",STRL,LF: Brand: MEDLINE

## (undated) DEVICE — GARMENT,MEDLINE,DVT,INT,CALF,MED, GEN2: Brand: MEDLINE

## (undated) DEVICE — MEDI-VAC SUCTION HIGH CAPACITY: Brand: CARDINAL HEALTH

## (undated) DEVICE — STERILE POLYISOPRENE POWDER-FREE SURGICAL GLOVES: Brand: PROTEXIS

## (undated) DEVICE — TRAY PREP DRY W/ PREM GLV 2 APPL 6 SPNG 2 UNDPD 1 OVERWRAP

## (undated) DEVICE — DRAPE XR C ARM 41X74IN LF --

## (undated) DEVICE — GOWN,AURORA,NONRNF,XL,30/CS: Brand: MEDLINE

## (undated) DEVICE — SUTURE VCRL SZ 0 L36IN ABSRB UD L36MM CT-1 1/2 CIR J946H

## (undated) DEVICE — DRESSING PETRO W3XL8IN N ADH OIL EMUL GZ CURAD

## (undated) DEVICE — 3M™ IOBAN™ 2 ANTIMICROBIAL INCISE DRAPE 6650EZ: Brand: IOBAN™ 2

## (undated) DEVICE — PACK PROCEDURE SURG EXTREMITY CUST

## (undated) DEVICE — BANDAGE COMPR W4INXL10YD WHITE/BEIGE E MTRX HK LOOP CLSR

## (undated) DEVICE — 3M™ STERI-DRAPE™ INSTRUMENT POUCH 1018: Brand: STERI-DRAPE™

## (undated) DEVICE — Device

## (undated) DEVICE — GUIDEWIRE ORTH L230MM DIA2.5MM S STL SPADE PNT TIP

## (undated) DEVICE — TOWEL,OR,DSP,ST,BLUE,STD,4/PK,20PK/CS: Brand: MEDLINE

## (undated) DEVICE — SPONGE GZ W4XL4IN COT 12 PLY TYP VII WVN C FLD DSGN

## (undated) DEVICE — SUTURE VCRL SZ 2-0 L36IN ABSRB UD L40MM CT 1/2 CIR J957H

## (undated) DEVICE — SOL IRRIGATION INJ NACL 0.9% 500ML BTL